# Patient Record
Sex: MALE | Race: WHITE | Employment: OTHER | URBAN - METROPOLITAN AREA
[De-identification: names, ages, dates, MRNs, and addresses within clinical notes are randomized per-mention and may not be internally consistent; named-entity substitution may affect disease eponyms.]

---

## 2019-06-10 ENCOUNTER — HOSPITAL ENCOUNTER (OUTPATIENT)
Dept: MRI IMAGING | Age: 84
Discharge: HOME | End: 2019-06-10
Attending: FAMILY MEDICINE

## 2020-12-14 ENCOUNTER — TRANSCRIBE ORDER (OUTPATIENT)
Dept: SCHEDULING | Age: 85
End: 2020-12-14

## 2020-12-14 DIAGNOSIS — C61 MALIGNANT NEOPLASM OF PROSTATE (HCC): Primary | ICD-10-CM

## 2020-12-14 DIAGNOSIS — N18.9 CHRONIC KIDNEY DISEASE, UNSPECIFIED: Primary | ICD-10-CM

## 2020-12-30 ENCOUNTER — HOSPITAL ENCOUNTER (OUTPATIENT)
Dept: NUCLEAR MEDICINE | Age: 85
Discharge: HOME | End: 2020-12-30
Attending: UROLOGY
Payer: MEDICARE

## 2020-12-30 ENCOUNTER — HOSPITAL ENCOUNTER (OUTPATIENT)
Dept: GENERAL RADIOLOGY | Age: 85
Discharge: HOME | End: 2020-12-30
Attending: UROLOGY
Payer: MEDICARE

## 2020-12-30 ENCOUNTER — TRANSCRIBE ORDER (OUTPATIENT)
Dept: REGISTRATION | Age: 85
End: 2020-12-30

## 2020-12-30 DIAGNOSIS — C61 MALIGNANT NEOPLASM OF PROSTATE (HCC): ICD-10-CM

## 2020-12-30 DIAGNOSIS — C80.1 CANCER (HCC): ICD-10-CM

## 2020-12-30 DIAGNOSIS — C80.1 CANCER (HCC): Primary | ICD-10-CM

## 2020-12-30 PROCEDURE — 71046 X-RAY EXAM CHEST 2 VIEWS: CPT

## 2020-12-30 PROCEDURE — 78306 BONE IMAGING WHOLE BODY: CPT

## 2021-01-07 ENCOUNTER — HOSPITAL ENCOUNTER (OUTPATIENT)
Dept: RADIATION THERAPY | Age: 86
Discharge: HOME | End: 2021-01-07

## 2021-01-27 ENCOUNTER — HOSPITAL ENCOUNTER (OUTPATIENT)
Dept: RADIATION THERAPY | Age: 86
Discharge: HOME | End: 2021-01-27

## 2021-03-14 ENCOUNTER — HOSPITAL ENCOUNTER (OUTPATIENT)
Dept: PREADMISSION TESTING | Age: 86
Discharge: HOME | End: 2021-03-14
Payer: MEDICARE

## 2021-03-14 ENCOUNTER — TRANSCRIBE ORDER (OUTPATIENT)
Dept: REGISTRATION | Age: 86
End: 2021-03-14

## 2021-03-14 DIAGNOSIS — Z01.812 PRE-PROCEDURE LAB EXAM: Primary | ICD-10-CM

## 2021-03-14 PROCEDURE — U0003 INFECTIOUS AGENT DETECTION BY NUCLEIC ACID (DNA OR RNA); SEVERE ACUTE RESPIRATORY SYNDROME CORONAVIRUS 2 (SARS-COV-2) (CORONAVIRUS DISEASE [COVID-19]), AMPLIFIED PROBE TECHNIQUE, MAKING USE OF HIGH THROUGHPUT TECHNOLOGIES AS DESCRIBED BY CMS-2020-01-R: HCPCS

## 2021-03-15 LAB — SARS-COV-2, COV2NT: NOT DETECTED

## 2021-03-18 ENCOUNTER — HOSPITAL ENCOUNTER (OUTPATIENT)
Age: 86
Setting detail: OUTPATIENT SURGERY
Discharge: HOME | End: 2021-03-18
Attending: INTERNAL MEDICINE | Admitting: INTERNAL MEDICINE
Payer: MEDICARE

## 2021-03-18 ENCOUNTER — APPOINTMENT (OUTPATIENT)
Dept: ULTRASOUND IMAGING | Age: 86
End: 2021-03-18
Attending: INTERNAL MEDICINE
Payer: MEDICARE

## 2021-03-18 ENCOUNTER — ANESTHESIA (OUTPATIENT)
Dept: ENDOSCOPY | Age: 86
End: 2021-03-18
Payer: MEDICARE

## 2021-03-18 ENCOUNTER — ANESTHESIA EVENT (OUTPATIENT)
Dept: ENDOSCOPY | Age: 86
End: 2021-03-18
Payer: MEDICARE

## 2021-03-18 VITALS
WEIGHT: 192 LBS | HEART RATE: 70 BPM | DIASTOLIC BLOOD PRESSURE: 87 MMHG | SYSTOLIC BLOOD PRESSURE: 146 MMHG | HEIGHT: 71 IN | RESPIRATION RATE: 16 BRPM | TEMPERATURE: 98 F | BODY MASS INDEX: 26.88 KG/M2 | OXYGEN SATURATION: 95 %

## 2021-03-18 PROCEDURE — 77030010936 HC CLP LIG BSC -C: Performed by: INTERNAL MEDICINE

## 2021-03-18 PROCEDURE — 76040000007: Performed by: INTERNAL MEDICINE

## 2021-03-18 PROCEDURE — 74011250636 HC RX REV CODE- 250/636: Performed by: INTERNAL MEDICINE

## 2021-03-18 PROCEDURE — 88305 TISSUE EXAM BY PATHOLOGIST: CPT

## 2021-03-18 PROCEDURE — 74011250636 HC RX REV CODE- 250/636: Performed by: NURSE ANESTHETIST, CERTIFIED REGISTERED

## 2021-03-18 PROCEDURE — 77030013992 HC SNR POLYP ENDOSC BSC -B: Performed by: INTERNAL MEDICINE

## 2021-03-18 PROCEDURE — 77030021593 HC FCPS BIOP ENDOSC BSC -A: Performed by: INTERNAL MEDICINE

## 2021-03-18 PROCEDURE — 74011000250 HC RX REV CODE- 250: Performed by: NURSE ANESTHETIST, CERTIFIED REGISTERED

## 2021-03-18 PROCEDURE — 2709999900 HC NON-CHARGEABLE SUPPLY: Performed by: INTERNAL MEDICINE

## 2021-03-18 PROCEDURE — A4648 IMPLANTABLE TISSUE MARKER: HCPCS | Performed by: INTERNAL MEDICINE

## 2021-03-18 PROCEDURE — 76060000032 HC ANESTHESIA 0.5 TO 1 HR: Performed by: INTERNAL MEDICINE

## 2021-03-18 RX ORDER — MIDAZOLAM HYDROCHLORIDE 1 MG/ML
.25-5 INJECTION, SOLUTION INTRAMUSCULAR; INTRAVENOUS
Status: DISCONTINUED | OUTPATIENT
Start: 2021-03-18 | End: 2021-03-18 | Stop reason: HOSPADM

## 2021-03-18 RX ORDER — GLIMEPIRIDE 1 MG/1
0.5 TABLET ORAL
Status: ON HOLD | COMMUNITY
End: 2021-12-15 | Stop reason: CLARIF

## 2021-03-18 RX ORDER — LEVOFLOXACIN 250 MG/1
250 TABLET ORAL DAILY
Qty: 2 TAB | Refills: 0 | Status: SHIPPED | OUTPATIENT
Start: 2021-03-19 | End: 2021-03-21

## 2021-03-18 RX ORDER — DEXTROMETHORPHAN/PSEUDOEPHED 2.5-7.5/.8
1.2 DROPS ORAL
Status: DISCONTINUED | OUTPATIENT
Start: 2021-03-18 | End: 2021-03-18 | Stop reason: HOSPADM

## 2021-03-18 RX ORDER — ATROPINE SULFATE 0.1 MG/ML
0.5 INJECTION INTRAVENOUS
Status: DISCONTINUED | OUTPATIENT
Start: 2021-03-18 | End: 2021-03-18 | Stop reason: HOSPADM

## 2021-03-18 RX ORDER — TAMSULOSIN HYDROCHLORIDE 0.4 MG/1
0.4 CAPSULE ORAL
COMMUNITY

## 2021-03-18 RX ORDER — PHENYLEPHRINE HCL IN 0.9% NACL 0.4MG/10ML
SYRINGE (ML) INTRAVENOUS AS NEEDED
Status: DISCONTINUED | OUTPATIENT
Start: 2021-03-18 | End: 2021-03-18 | Stop reason: HOSPADM

## 2021-03-18 RX ORDER — NALOXONE HYDROCHLORIDE 0.4 MG/ML
0.4 INJECTION, SOLUTION INTRAMUSCULAR; INTRAVENOUS; SUBCUTANEOUS
Status: DISCONTINUED | OUTPATIENT
Start: 2021-03-18 | End: 2021-03-18 | Stop reason: HOSPADM

## 2021-03-18 RX ORDER — FENTANYL CITRATE 50 UG/ML
25-200 INJECTION, SOLUTION INTRAMUSCULAR; INTRAVENOUS
Status: DISCONTINUED | OUTPATIENT
Start: 2021-03-18 | End: 2021-03-18 | Stop reason: HOSPADM

## 2021-03-18 RX ORDER — SODIUM CHLORIDE 9 MG/ML
INJECTION, SOLUTION INTRAVENOUS
Status: DISCONTINUED | OUTPATIENT
Start: 2021-03-18 | End: 2021-03-18 | Stop reason: HOSPADM

## 2021-03-18 RX ORDER — FLUMAZENIL 0.1 MG/ML
0.2 INJECTION INTRAVENOUS
Status: DISCONTINUED | OUTPATIENT
Start: 2021-03-18 | End: 2021-03-18 | Stop reason: HOSPADM

## 2021-03-18 RX ORDER — SODIUM CHLORIDE 0.9 % (FLUSH) 0.9 %
5-40 SYRINGE (ML) INJECTION EVERY 8 HOURS
Status: DISCONTINUED | OUTPATIENT
Start: 2021-03-18 | End: 2021-03-18 | Stop reason: HOSPADM

## 2021-03-18 RX ORDER — LEVOFLOXACIN 5 MG/ML
500 INJECTION, SOLUTION INTRAVENOUS ONCE
Status: COMPLETED | OUTPATIENT
Start: 2021-03-18 | End: 2021-03-18

## 2021-03-18 RX ORDER — PROPOFOL 10 MG/ML
INJECTION, EMULSION INTRAVENOUS AS NEEDED
Status: DISCONTINUED | OUTPATIENT
Start: 2021-03-18 | End: 2021-03-18 | Stop reason: HOSPADM

## 2021-03-18 RX ORDER — EPINEPHRINE 0.1 MG/ML
1 INJECTION INTRACARDIAC; INTRAVENOUS
Status: DISCONTINUED | OUTPATIENT
Start: 2021-03-18 | End: 2021-03-18 | Stop reason: HOSPADM

## 2021-03-18 RX ORDER — SIMVASTATIN 20 MG/1
20 TABLET, FILM COATED ORAL
COMMUNITY

## 2021-03-18 RX ORDER — SODIUM CHLORIDE 0.9 % (FLUSH) 0.9 %
5-40 SYRINGE (ML) INJECTION AS NEEDED
Status: DISCONTINUED | OUTPATIENT
Start: 2021-03-18 | End: 2021-03-18 | Stop reason: HOSPADM

## 2021-03-18 RX ORDER — LEVOFLOXACIN 5 MG/ML
250 INJECTION, SOLUTION INTRAVENOUS ONCE
Status: DISCONTINUED | OUTPATIENT
Start: 2021-03-18 | End: 2021-03-18

## 2021-03-18 RX ORDER — SODIUM CHLORIDE 9 MG/ML
50 INJECTION, SOLUTION INTRAVENOUS CONTINUOUS
Status: DISCONTINUED | OUTPATIENT
Start: 2021-03-18 | End: 2021-03-18 | Stop reason: HOSPADM

## 2021-03-18 RX ORDER — LIDOCAINE HYDROCHLORIDE 20 MG/ML
INJECTION, SOLUTION EPIDURAL; INFILTRATION; INTRACAUDAL; PERINEURAL AS NEEDED
Status: DISCONTINUED | OUTPATIENT
Start: 2021-03-18 | End: 2021-03-18 | Stop reason: HOSPADM

## 2021-03-18 RX ADMIN — PROPOFOL 20 MG: 10 INJECTION, EMULSION INTRAVENOUS at 11:48

## 2021-03-18 RX ADMIN — PROPOFOL 40 MG: 10 INJECTION, EMULSION INTRAVENOUS at 11:41

## 2021-03-18 RX ADMIN — PHENYLEPHRINE HYDROCHLORIDE 120 MCG: 10 INJECTION INTRAVENOUS at 11:46

## 2021-03-18 RX ADMIN — PROPOFOL 50 MG: 10 INJECTION, EMULSION INTRAVENOUS at 11:07

## 2021-03-18 RX ADMIN — PROPOFOL 10 MG: 10 INJECTION, EMULSION INTRAVENOUS at 11:27

## 2021-03-18 RX ADMIN — PROPOFOL 20 MG: 10 INJECTION, EMULSION INTRAVENOUS at 11:33

## 2021-03-18 RX ADMIN — PROPOFOL 20 MG: 10 INJECTION, EMULSION INTRAVENOUS at 11:25

## 2021-03-18 RX ADMIN — LEVOFLOXACIN 500 MG: 5 INJECTION, SOLUTION INTRAVENOUS at 11:40

## 2021-03-18 RX ADMIN — PROPOFOL 50 MG: 10 INJECTION, EMULSION INTRAVENOUS at 11:08

## 2021-03-18 RX ADMIN — PROPOFOL 20 MG: 10 INJECTION, EMULSION INTRAVENOUS at 11:12

## 2021-03-18 RX ADMIN — PROPOFOL 20 MG: 10 INJECTION, EMULSION INTRAVENOUS at 11:37

## 2021-03-18 RX ADMIN — PROPOFOL 20 MG: 10 INJECTION, EMULSION INTRAVENOUS at 11:14

## 2021-03-18 RX ADMIN — SODIUM CHLORIDE: 900 INJECTION, SOLUTION INTRAVENOUS at 11:03

## 2021-03-18 RX ADMIN — PROPOFOL 20 MG: 10 INJECTION, EMULSION INTRAVENOUS at 11:20

## 2021-03-18 RX ADMIN — PROPOFOL 30 MG: 10 INJECTION, EMULSION INTRAVENOUS at 11:35

## 2021-03-18 RX ADMIN — PHENYLEPHRINE HYDROCHLORIDE 120 MCG: 10 INJECTION INTRAVENOUS at 11:32

## 2021-03-18 RX ADMIN — PROPOFOL 20 MG: 10 INJECTION, EMULSION INTRAVENOUS at 11:18

## 2021-03-18 RX ADMIN — PROPOFOL 20 MG: 10 INJECTION, EMULSION INTRAVENOUS at 11:16

## 2021-03-18 RX ADMIN — PROPOFOL 20 MG: 10 INJECTION, EMULSION INTRAVENOUS at 11:10

## 2021-03-18 RX ADMIN — PROPOFOL 20 MG: 10 INJECTION, EMULSION INTRAVENOUS at 11:22

## 2021-03-18 RX ADMIN — LIDOCAINE HYDROCHLORIDE 60 MG: 20 INJECTION, SOLUTION EPIDURAL; INFILTRATION; INTRACAUDAL; PERINEURAL at 11:07

## 2021-03-18 RX ADMIN — PROPOFOL 20 MG: 10 INJECTION, EMULSION INTRAVENOUS at 11:31

## 2021-03-18 NOTE — ANESTHESIA PREPROCEDURE EVALUATION
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 Pacemaker has been removed. No further sequelae. Seen and cleared by cardiology prior to this procedure.    GI/Hepatic/Renal         Renal disease: CRI       Endo/Other    Diabetes: well controlled, type 2    Cancer    Comments: Prostate CA Other Findings            Physical Exam    Airway  Mallampati: II  TM Distance: 4 - 6 cm  Neck ROM: normal range of motion   Mouth opening: Normal     Cardiovascular    Rhythm: regular  Rate: normal         Dental    Dentition: Poor dentition     Pulmonary  Breath sounds clear to auscultation               Abdominal  GI exam deferred       Other Findings            Anesthetic Plan    ASA: 3  Anesthesia type: MAC and total IV anesthesia          Induction: Intravenous  Anesthetic plan and risks discussed with: Patient

## 2021-03-18 NOTE — PROCEDURES
[address]  [address] [facility], [address] [place] [address]      Colonoscopy Operative Report    Lisseth Teixeira  [id number]  1935      Procedure Type:   Colonoscopy --screening     Indications:    Screening colonoscopy         Pre-operative Diagnosis: see indication above    Post-operative Diagnosis:  See findings below    :  Tea Huffman. Alvaro Catherine MD      Referring Provider: WILFREDO Mittal MD, Avril Ballard MD      Sedation:  MAC anesthesia Propofol      Procedure Details:  After informed consent was obtained with all risks and benefits of procedure explained and preoperative exam completed, the patient was taken to the endoscopy suite and placed in the left lateral decubitus position. Upon sequential sedation as per above, a digital rectal exam was performed demonstrating internal hemorrhoids. The Olympus pediatric videocolonoscope  was inserted in the rectum and carefully advanced to the cecum, which was identified by the ileocecal valve and appendiceal orifice. The cecum was identified by the ileocecal valve and appendiceal orifice. The quality of preparation was good. The colonoscope was slowly withdrawn with careful evaluation between folds. Retroflexion in the rectum was completed . Findings:   Rectum: normal  Sigmoid: single 7 mm sessile polyp - removed with hot snare. Two Resolution 360 clips placed to close polypectomy site. Mild diverticulosis  Descending Colon: single 5-6 mm sessile polyp removed with hot snare. Two Resolution 360 clips placed to close polypectomy site  Transverse Colon: normal  Ascending Colon: normal  Cecum: two 2-3 mm sessile polyps - removed with cold biopsy forceps  Terminal Ileum: not intubated      Specimen Removed: 1. Cecal polyps, 2. Descending colon polyp, 3. Sigmoid polyp    Complications: None. EBL:  None. Impression:    1. Colon polyps - removed  2. Mild sigmoid diverticulosis  3.  Small internal Edwardo Acevedo 4:38 PM Note      Reason for Call:  Other call back and prescription     Detailed comments: Zaria is calling on behalf on Jane. She is wondering if you were the one that requested the lidocaine cream. Also if you are able to prescribe the Tramadol on a as needed basis instead of scheduled. She also has a few other questions for the doctor if you can please call and speak to her. Thank you      Phone Number Patient can be reached at: [phone]     Best Time: Any     Can we leave a detailed message on this number? YES     Call taken on 7/10/2018 at 4:38 PM by Edwardo Acevedo              Original message above placed in wife's chart in error. Spoke with Zaria and verified correct patient info.    Zaria calling with 2 concerns.     1) Verifying reason for new Rx sent for Lidocaine ointment. Zaria advised of Notes per Dr. Burch in phone encounter on 6/26/18:  Issa Burch MD 8:45 AM Note      Inform patient's wife that I will send Rx for Lidocaine 5% ointment as substitute for Lidocaine 5% patch.        2) Patient previously taking Tramadol scheduled along with prn dosing. The last script prescribed was for scheduled dosing only. Zaria needs clarification on change in dosing.    Advised Zaria will route to Dr. Burch for review.  Arely Kwan, CMA           hemorrhoids    Recommendations:  1. Follow up surgical pathology  2. High fiber diet education  3. Repeat colonoscopy for interval surveillance will not be needed given his age of 82  4. Proceed to rectal endoscopic ultrasound with placement of fiducial markers into the prostate    Signed By: Oriana Sal.  James Burt MD     3/18/2021  12:04 PM

## 2021-03-18 NOTE — PROCEDURES
[address]  [place], [address]     Rectal Endoscopic Ultrasound with Fiducial Marker Placement     NAME:  Coleen Portillo   :   1935   MRN:   [id number]       Procedure Name: Rectal endoscopic ultrasound with fiducial marker placement    Indications: prostate cancer    : Pedro Pablo Wagner. Cintia Perez MD    Referring Provider: -WILFREDO Montana MD, -Justin Fabian MD      Anethesia/Sedation:  MAC anesthesia Propofol      Procedure Details   After informed consent was obtained for the procedure, with all risks and benefits of procedure explained the patient was taken to the endoscopy suite and placed in the left lateral decubitus position. Initially, a colonoscopy was performed. Please see separate procedure note for details. The rectal linear echoendoscope was passed to 25 cm. The iliac vessels were seen and were normal, and there was no adenopathy appreciated. The patient tolerated the procedure well. Findings:     The prostate gland, seminal vesicles, and bladder were identified. A total of four 0.46 mm by 5 mm Lumicoil platinum fiducial markers were placed into the prostate. An array of anechoic spaces/septations were seen in the prostate and these were carefully avoided. Specimen Removed: none    Complications: None. EBL:  None. Impression:    1. Placement of four Lumicoil platinum fiducial markers into the prostate for the purposes of targeting radiation therapy    Recommendations:     1. Levaquin 500 mg IV administered during the procedure. This dose was cleared by pharmacy in setting of his known CKD  2. Script for Levaquin 250 mg daily for two days provided. To be started tomorrow 3/19/21  3. Follow up with Dr. Marily Suresh By: Pedro Pablo Wagner.  Cintia Perez MD     3/18/2021  12:09 PM

## 2021-03-18 NOTE — DISCHARGE INSTRUCTIONS
[facility]    COLON DISCHARGE INSTRUCTIONS    Ronny Faye  [id number]  1935    Discomfort:  Redness at IV site- apply warm compress to area; if redness or soreness persist- contact your physician  There may be a slight amount of blood passed from the rectum  Gaseous discomfort- walking, belching will help relieve any discomfort  You may not operate a vehicle for 12 hours  You may not engage in an occupation involving machinery or appliances for rest of today  You may not drink alcoholic beverages for at least 12 hours  Avoid making any critical decisions for at least 24 hour  DIET:  You may resume your regular diet - however -  remember your colon is empty and a heavy meal will produce gas. Avoid these foods:  vegetables, fried / greasy foods, carbonated drinks     ACTIVITY:  You may  resume your normal daily activities it is recommended that you spend the remainder of the day resting -  avoid any strenuous activity. CALL M.D. ANY SIGN OF:   Increasing pain, nausea, vomiting  Abdominal distension (swelling)  New increased bleeding (oral or rectal)  Fever (chills)  Pain in chest area  Bloody discharge from nose or mouth  Shortness of breath      Follow-up Instructions:   Call Dr. Keri Ziegler for any questions or problems at [address] [place] [address] St:   Your colonoscopy showed 4 polyps, which were removed. We will contact you about the pathology results. Fiducial markers were placed into the prostate. A prescription for an antibiotic has been provided. You do not need to take this antibiotic today as you received antibiotics during your procedure. Please take this two day course starting tomorrow (3/19/21). Please follow up with Dr. Bharat Donahue. Telephone # [phone]      Signed By: Tommy Ng.  Jordan Hawley MD     3/18/2021  12:02 PM       DISCHARGE SUMMARY from Nurse    The following personal items collected during your admission are returned to you:   Dental Appliance: Dental Appliances: None  Vision: Visual Aid: Glasses, At bedside  Hearing Aid:    Jewelry:    Clothing:    Other Valuables:    Valuables sent to safe:      Learning About Coronavirus (COVID-19)  Coronavirus (COVID-19): Overview  What is coronavirus ([id number])? The coronavirus disease (COVID-19) is caused by a virus. It is an illness that was first found in [place], [place], in December 2019. It has since spread worldwide. The virus can cause fever, cough, and trouble breathing. In severe cases, it can cause pneumonia and make it hard to breathe without help. It can cause death. Coronaviruses are a large group of viruses. They cause the common cold. They also cause more serious illnesses like Middle East respiratory syndrome (MERS) and severe acute respiratory syndrome (SARS). COVID-19 is caused by a novel coronavirus. That means it's a new type that has not been seen in people before. This virus spreads person-to-person through droplets from coughing and sneezing. It can also spread when you are close to someone who is infected. And it can spread when you touch something that has the virus on it, such as a doorknob or a tabletop. What can you do to protect yourself from coronavirus (COVID-19)? The best way to protect yourself from getting sick is to:  · Avoid areas where there is an outbreak. · Avoid contact with people who may be infected. · Wash your hands often with soap or alcohol-based hand sanitizers. · Avoid crowds and try to stay at least 6 feet away from other people. · Wash your hands often, especially after you cough or sneeze. Use soap and water, and scrub for at least 20 seconds. If soap and water aren't available, use an alcohol-based hand . · Avoid touching your mouth, nose, and eyes. What can you do to avoid spreading the virus to others? To help avoid spreading the virus to others:  · Cover your mouth with a tissue when you cough or sneeze. Then throw the tissue in the trash. · Use a disinfectant to clean things that you touch often. · Stay home if you are sick or have been exposed to the virus. Don't go to school, work, or public areas. And don't use public transportation. · If you are sick:  ? Leave your home only if you need to get medical care. But call the doctor's office first so they know you're coming. And wear a face mask, if you have one.  ? If you have a face mask, wear it whenever you're around other people. It can help stop the spread of the virus when you cough or sneeze. ? Clean and disinfect your home every day. Use household  and disinfectant wipes or sprays. Take special care to clean things that you grab with your hands. These include doorknobs, remote controls, phones, and handles on your refrigerator and microwave. And don't forget countertops, tabletops, bathrooms, and computer keyboards. When to call for help  Call 911 anytime you think you may need emergency care. For example, call if:  · You have severe trouble breathing. (You can't talk at all.)  · You have constant chest pain or pressure. · You are severely dizzy or lightheaded. · You are confused or can't think clearly. · Your face and lips have a blue color. · You pass out (lose consciousness) or are very hard to wake up. Call your doctor now if you develop symptoms such as:  · Shortness of breath. · Fever. · Cough. If you need to get care, call ahead to the doctor's office for instructions before you go. Make sure you wear a face mask, if you have one, to prevent exposing other people to the virus. Where can you get the latest information? The following health organizations are tracking and studying this virus. Their websites contain the most up-to-date information. Edwige Rai also learn what to do if you think you may have been exposed to the virus. · U.S. Centers for Disease Control and Prevention (CDC):  The CDC provides updated news about the disease and travel advice. The website also tells you how to prevent the spread of infection. www.cdc.gov  · World Health Organization [facility]): WHO offers information about the virus outbreaks. WHO also has travel advice. www.who.int  Current as of: April 1, 2020               Content Version: 12.4  © 1184-9387 [employer]wise, Incorporated. Care instructions adapted under license by your healthcare professional. If you have questions about a medical condition or this instruction, always ask your healthcare professional. [address] any warranty or liability for your use of this information.

## 2021-03-18 NOTE — H&P
[address], [address] [place] [address]      History and Physical       NAME:  Danae Guzman   :   1935   MRN:   [id number]             History of Present Illness:  Patient is a 82 y.o. who is seen for colon cancer screening. Last colonoscopy was >5 years ago and no polyps were removed. He has prostate cancer and we have been requested to place fiducial markers into the prostate. PMH:  Past Medical History:   Diagnosis Date    Arthritis     right hand    Cancer ([address]     skin cancer,left ear    Hypertension     Psychiatric disorder     depression       PSH:  Past Surgical History:   Procedure Laterality Date    HX ORTHOPAEDIC      right shoylder cyst removed,part of clavicle    HX PACEMAKER      35 years ago changedx1 then removed    HX TONSILLECTOMY      adenoidectomy as child       Allergies:  No Known Allergies    Home Medications:  Prior to Admission Medications   Prescriptions Last Dose Informant Patient Reported? Taking? PAROXETINE HCL PO 3/18/2021 at Unknown time  Yes Yes   Sig: Take  by mouth. aspirin delayed-release (ASPIRIN LOW DOSE) 81 mg tablet 3/13/2021  Yes No   Sig: Take 81 mg by mouth daily. calcium phosphate trib/vit D3 (CITRACAL + D3, CALCIUM PHOS, PO) 3/17/2021 at Unknown time  Yes Yes   Sig: Take  by mouth.   glimepiride (AMARYL) 1 mg tablet 3/17/2021 at Unknown time  Yes Yes   Sig: Take 0.5 mg by mouth Daily (before breakfast). leuprolide acetate (LUPRON DEPOT IM)   Yes Yes   Sig: by IntraMUSCular route. lisinopril (PRINIVIL, ZESTRIL) 10 mg tablet 3/17/2021 at Unknown time  Yes Yes   Sig: Take 10 mg by mouth daily. lorazepam (ATIVAN) 1 mg tablet 3/17/2021 at Unknown time  Yes Yes   Sig: Take 1 mg by mouth nightly. simvastatin (ZOCOR) 20 mg tablet 3/17/2021 at Unknown time  Yes Yes   Sig: Take  by mouth nightly. tamsulosin (FLOMAX) 0.4 mg capsule 3/17/2021 at Unknown time  Yes Yes   Sig: Take 0.4 mg by mouth daily. Facility-Administered Medications: None       Hospital Medications:  No current facility-administered medications for this encounter. Social History:  Social History     Tobacco Use    Smoking status: Former Smoker     Years: 40.00    Smokeless tobacco: Never Used   Substance Use Topics    Alcohol use: Yes     Comment: rarely       Family History:  History reviewed. No pertinent family history. Review of Systems:      Constitutional: negative fever, negative chills, negative weight loss  Eyes:   negative visual changes  ENT:   negative sore throat, tongue or lip swelling  Respiratory:  negative cough, negative dyspnea  Cards:  negative for chest pain, palpitations, lower extremity edema  GI:   See HPI  :  negative for frequency, dysuria  Integument:  negative for rash and pruritus  Heme:  negative for easy bruising and gum/nose bleeding  Musculoskel: negative for myalgias,  back pain and muscle weakness  Neuro: negative for headaches, dizziness, vertigo  Psych:  negative for feelings of anxiety, depression       Objective:   No data found. No intake/output data recorded. No intake/output data recorded. EXAM:     NEURO-a&o   HEENT-wnl   LUNGS-clear    COR-regular rate and rhythym     ABD-soft , no tenderness, no rebound, good bs     EXT-no edema     Data Review     No results for input(s): WBC, HGB, HCT, PLT, HGBEXT, HCTEXT, PLTEXT in the last 72 hours. No results for input(s): NA, K, CL, CO2, BUN, CREA, GLU, PHOS, CA in the last 72 hours. No results for input(s): AP, TBIL, TP, ALB, GLOB, GGT, AML, LPSE in the last 72 hours. No lab exists for component: SGOT, GPT, AMYP, HLPSE  No results for input(s): INR, PTP, APTT, INREXT in the last 72 hours. Assessment:     · Colon cancer screening  · Prostate cancer   There is no problem list on file for this patient.     Plan:   · The patient was counseled at length about the risks of amber Covid-19 in the erica-operative and post-operative states including the recovery window of their procedure. The patient was made aware that amber Covid-19 after a surgical procedure may worsen their prognosis for recovering from the virus and lend to a higher morbidity and or mortality risk. The patient was given the options of postponing their procedure. All of the risks, benefits, and alternatives were discussed. The patient does wish to proceed with the procedure. · Endoscopic procedure with MAC     Signed By: Kiana Mendoza.  Fabricio Uribe MD     3/18/2021  10:40 AM

## 2021-03-18 NOTE — ANESTHESIA POSTPROCEDURE EVALUATION
Procedure(s):  COLONOSCOPY/RECTAL EUS WITH FIDUCIAL MARKERS   :-  ENDOSCOPIC ULTRASOUND (EUS)  ENDOSCOPIC POLYPECTOMY  RESOLUTION CLIP. MAC    Anesthesia Post Evaluation        Patient location during evaluation: PACU  Note status: adequate.   Level of consciousness: awake  Pain management: satisfactory to patient  Airway patency: patent  Anesthetic complications: no  Cardiovascular status: acceptable  Respiratory status: acceptable  Hydration status: acceptable  Post anesthesia nausea and vomiting:  controlled      INITIAL Post-op Vital signs:   Vitals Value Taken Time   /87 03/18/21 1230   Temp 36.7 °C (98 °F) 03/18/21 1207   Pulse 70 03/18/21 1230   Resp 16 03/18/21 1230   SpO2 95 % 03/18/21 1230

## 2021-03-18 NOTE — ROUTINE PROCESS
Ayaan [facility]  1935  [id number]    Situation:  Verbal report received from: Victoriano Sanchez  Procedure: Procedure(s):  COLONOSCOPY/RECTAL EUS WITH FIDUCIAL MARKERS   :-  ENDOSCOPIC ULTRASOUND (EUS)  ENDOSCOPIC POLYPECTOMY  RESOLUTION CLIP    Background:    Preoperative diagnosis: SCREENING/INSERTION OF FIDUCIAL MARKERS FOR PROSTATE CANCER  Postoperative diagnosis: Colon Polyps  Diverticulosis  4 Fiducial Markers Placed     :  Dr. Kenneth Bueno  Assistant(s): Endoscopy Technician-1: Esau Oro  Endoscopy RN-1: Ervin Mendoza RN    Specimens:   ID Type Source Tests Collected by Time Destination   1 : Cecal Polyps Preservative Cecum  John Zazueta MD 3/18/2021 1112 Pathology   2 : Descending Colon Polyp Preservative Colon, Descending  John Zazueta MD 3/18/2021 1119 Pathology   3 : Sigmoid Colon Polyp Preservative Sigmoid  John Zazueta MD 3/18/2021 1124 Pathology     H. Pylori  no    Assessment:  Intra-procedure medications   Anesthesia gave intra-procedure sedation and medications, see anesthesia flow sheet yes    Intravenous fluids: NS@ KVO     Vital signs stable     Abdominal assessment: round and soft     Recommendation:  Discharge patient per MD order.   Family or Friend Dtr [facility]  Permission to share finding with family or friend yes

## 2021-03-18 NOTE — PROGRESS NOTES

## 2021-03-25 ENCOUNTER — HOSPITAL ENCOUNTER (OUTPATIENT)
Dept: RADIATION THERAPY | Age: 86
Discharge: HOME | End: 2021-03-25

## 2021-04-13 ENCOUNTER — HOSPITAL ENCOUNTER (OUTPATIENT)
Dept: RADIATION THERAPY | Age: 86
Discharge: HOME | End: 2021-04-13

## 2021-04-14 ENCOUNTER — HOSPITAL ENCOUNTER (OUTPATIENT)
Dept: RADIATION THERAPY | Age: 86
Discharge: HOME | End: 2021-04-14

## 2021-04-15 ENCOUNTER — HOSPITAL ENCOUNTER (OUTPATIENT)
Dept: RADIATION THERAPY | Age: 86
Discharge: HOME | End: 2021-04-15

## 2021-04-19 ENCOUNTER — HOSPITAL ENCOUNTER (OUTPATIENT)
Dept: RADIATION THERAPY | Age: 86
Discharge: HOME | End: 2021-04-19

## 2021-04-20 ENCOUNTER — HOSPITAL ENCOUNTER (OUTPATIENT)
Dept: RADIATION THERAPY | Age: 86
Discharge: HOME | End: 2021-04-20

## 2021-04-21 ENCOUNTER — HOSPITAL ENCOUNTER (OUTPATIENT)
Dept: RADIATION THERAPY | Age: 86
Discharge: HOME | End: 2021-04-21

## 2021-04-22 ENCOUNTER — HOSPITAL ENCOUNTER (OUTPATIENT)
Dept: RADIATION THERAPY | Age: 86
Discharge: HOME | End: 2021-04-22

## 2021-04-23 ENCOUNTER — HOSPITAL ENCOUNTER (OUTPATIENT)
Dept: RADIATION THERAPY | Age: 86
Discharge: HOME | End: 2021-04-23

## 2021-04-26 ENCOUNTER — HOSPITAL ENCOUNTER (OUTPATIENT)
Dept: RADIATION THERAPY | Age: 86
Discharge: HOME | End: 2021-04-26

## 2021-04-27 ENCOUNTER — HOSPITAL ENCOUNTER (OUTPATIENT)
Dept: RADIATION THERAPY | Age: 86
Discharge: HOME | End: 2021-04-27

## 2021-04-28 ENCOUNTER — HOSPITAL ENCOUNTER (OUTPATIENT)
Dept: RADIATION THERAPY | Age: 86
Discharge: HOME | End: 2021-04-28

## 2021-04-29 ENCOUNTER — HOSPITAL ENCOUNTER (OUTPATIENT)
Dept: RADIATION THERAPY | Age: 86
Discharge: HOME | End: 2021-04-29

## 2021-04-30 ENCOUNTER — HOSPITAL ENCOUNTER (OUTPATIENT)
Dept: RADIATION THERAPY | Age: 86
Discharge: HOME | End: 2021-04-30

## 2021-05-03 ENCOUNTER — HOSPITAL ENCOUNTER (OUTPATIENT)
Dept: RADIATION THERAPY | Age: 86
Discharge: HOME | End: 2021-05-03

## 2021-05-04 ENCOUNTER — HOSPITAL ENCOUNTER (OUTPATIENT)
Dept: RADIATION THERAPY | Age: 86
Discharge: HOME | End: 2021-05-04

## 2021-05-05 ENCOUNTER — HOSPITAL ENCOUNTER (OUTPATIENT)
Dept: RADIATION THERAPY | Age: 86
Discharge: HOME | End: 2021-05-05

## 2021-05-06 ENCOUNTER — HOSPITAL ENCOUNTER (OUTPATIENT)
Dept: RADIATION THERAPY | Age: 86
Discharge: HOME | End: 2021-05-06

## 2021-05-07 ENCOUNTER — HOSPITAL ENCOUNTER (OUTPATIENT)
Dept: RADIATION THERAPY | Age: 86
Discharge: HOME | End: 2021-05-07

## 2021-05-10 ENCOUNTER — HOSPITAL ENCOUNTER (OUTPATIENT)
Dept: RADIATION THERAPY | Age: 86
Discharge: HOME | End: 2021-05-10

## 2021-05-11 ENCOUNTER — HOSPITAL ENCOUNTER (OUTPATIENT)
Dept: RADIATION THERAPY | Age: 86
Discharge: HOME | End: 2021-05-11

## 2021-05-12 ENCOUNTER — HOSPITAL ENCOUNTER (OUTPATIENT)
Dept: RADIATION THERAPY | Age: 86
Discharge: HOME | End: 2021-05-12

## 2021-05-13 ENCOUNTER — HOSPITAL ENCOUNTER (OUTPATIENT)
Dept: RADIATION THERAPY | Age: 86
Discharge: HOME | End: 2021-05-13

## 2021-05-14 ENCOUNTER — HOSPITAL ENCOUNTER (OUTPATIENT)
Dept: RADIATION THERAPY | Age: 86
Discharge: HOME | End: 2021-05-14

## 2021-05-17 ENCOUNTER — HOSPITAL ENCOUNTER (OUTPATIENT)
Dept: RADIATION THERAPY | Age: 86
Discharge: HOME | End: 2021-05-17

## 2021-05-18 ENCOUNTER — HOSPITAL ENCOUNTER (OUTPATIENT)
Dept: RADIATION THERAPY | Age: 86
Discharge: HOME | End: 2021-05-18

## 2021-05-19 ENCOUNTER — HOSPITAL ENCOUNTER (OUTPATIENT)
Dept: RADIATION THERAPY | Age: 86
Discharge: HOME | End: 2021-05-19

## 2021-05-20 ENCOUNTER — HOSPITAL ENCOUNTER (OUTPATIENT)
Dept: RADIATION THERAPY | Age: 86
Discharge: HOME | End: 2021-05-20

## 2021-05-21 ENCOUNTER — HOSPITAL ENCOUNTER (OUTPATIENT)
Dept: RADIATION THERAPY | Age: 86
Discharge: HOME | End: 2021-05-21

## 2021-06-10 PROBLEM — T44.6X5A TAMSULOSIN-ASSOCIATED INTRAOPERATIVE FLOPPY IRIS SYNDROME (IFIS): Status: ACTIVE | Noted: 2021-06-10

## 2021-06-10 PROBLEM — H25.811 COMBINED FORMS OF AGE-RELATED CATARACT OF RIGHT EYE: Status: ACTIVE | Noted: 2021-06-10

## 2021-06-10 PROBLEM — H21.81 TAMSULOSIN-ASSOCIATED INTRAOPERATIVE FLOPPY IRIS SYNDROME (IFIS): Status: ACTIVE | Noted: 2021-06-10

## 2021-06-10 NOTE — PERIOP NOTES
[facility]  Ambulatory Surgery Unit  Pre-operative Instructions    Surgery/Procedure Date  June 16, 2021            Tentative Arrival Time TBD      1. On the day of your surgery/procedure, please report to the Ambulatory Surgery Unit Registration Desk and sign in at your designated time. The Ambulatory Surgery Unit is located in [facility] on the [facility] side of the [facility] across from the [address] [place] [address]. Please have all of your health insurance cards and a photo ID. 2. You must have someone with you to drive you home, as you should not drive a car for 24 hours following anesthesia. Please make arrangements for a responsible adult friend or family member to stay with you for at least the first 24 hours after your surgery. 3. Do not have anything to eat or drink (including water, gum, mints, coffee, juice) after 11:59 PM,, June 15th. This may not apply to medications prescribed by your physician. (Please note below the special instructions with medications to take the morning of surgery, if applicable.)    4. We recommend you do not drink any alcoholic beverages for 24 hours before and after your surgery. 5. Contact your surgeons office for instructions on the following medications: non-steroidal anti-inflammatory drugs (i.e. Advil, Aleve), vitamins, and supplements. (Some surgeons will want you to stop these medications prior to surgery and others may allow you to take them)   **If you are currently taking Plavix, Coumadin, Aspirin and/or other blood-thinning agents, contact your surgeon for instructions. ** Your surgeon will partner with the physician prescribing these medications to determine if it is safe to stop or if you need to continue taking. Please do not stop taking these medications without instructions from your surgeon.     6. In an effort to help prevent surgical site infection, we ask that you shower with an anti-bacterial soap (i.e. Dial/Safeguard, or the soap provided to you at your preadmission testing appointment) for 3 days prior to and on the morning of surgery, using a fresh towel after each shower. (Please begin this process with fresh bed linens.) Do not apply any lotions, powders, or deodorants after the shower on the day of your procedure. If applicable, please do not shave the operative site for 48 hours prior to surgery. 7. Wear comfortable clothes. Wear glasses instead of contacts. Do not bring any jewelry or money (other than copays or fees as instructed). Do not wear make-up, particularly mascara, the morning of your surgery. Do not wear nail polish, particularly if you are having foot /hand surgery. Wear your hair loose or down, no ponytails, buns, cristopher pins or clips. All body piercings must be removed. 8. You should understand that if you do not follow these instructions your surgery may be cancelled. If your physical condition changes (i.e. fever, cold or flu) please contact your surgeon as soon as possible. 9. It is important that you be on time. If a situation occurs where you may be late, or if you have any questions or problems, please call [phone].    10. Your surgery time may be subject to change. You will receive a phone call the day prior to surgery to confirm your arrival time. 11. Pediatric patients: please bring a change of clothes, diapers, bottle/sippy cup, pacifier, etc.      Special Instructions: Take all medications and inhalers, as prescribed, on the morning of surgery with a sip of water EXCEPT: no diabetic medications. Insulin Dependent Diabetic patients: Take your diabetic medications as prescribed the day before surgery. Hold all diabetic medications the day of surgery. If you are scheduled to arrive for surgery after 8:00 AM, and your AM blood sugar is >200, please call Ambulatory Surgery. I understand a pre-operative phone call will be made to verify my surgery time.   In the event that I am not available, I give permission for a message to be left on my answering service and/or with another person?     yes    Preop instructions reviewed  Pt verbalized understanding.      ___________________      ___________________      ________________  (Signature of Patient)          (Witness)                   (Date and Time)

## 2021-06-15 ENCOUNTER — ANESTHESIA EVENT (OUTPATIENT)
Dept: SURGERY | Age: 86
End: 2021-06-15
Payer: MEDICARE

## 2021-06-16 ENCOUNTER — HOSPITAL ENCOUNTER (OUTPATIENT)
Age: 86
Setting detail: OUTPATIENT SURGERY
Discharge: HOME | End: 2021-06-16
Attending: OPHTHALMOLOGY | Admitting: OPHTHALMOLOGY
Payer: MEDICARE

## 2021-06-16 ENCOUNTER — ANESTHESIA (OUTPATIENT)
Dept: SURGERY | Age: 86
End: 2021-06-16
Payer: MEDICARE

## 2021-06-16 VITALS
SYSTOLIC BLOOD PRESSURE: 138 MMHG | HEIGHT: 71 IN | WEIGHT: 188 LBS | OXYGEN SATURATION: 98 % | HEART RATE: 51 BPM | RESPIRATION RATE: 12 BRPM | TEMPERATURE: 97.4 F | DIASTOLIC BLOOD PRESSURE: 49 MMHG | BODY MASS INDEX: 26.32 KG/M2

## 2021-06-16 LAB
GLUCOSE BLD STRIP.AUTO-MCNC: 133 MG/DL (ref 65–117)
GLUCOSE BLD STRIP.AUTO-MCNC: 137 MG/DL (ref 65–117)
SERVICE CMNT-IMP: ABNORMAL
SERVICE CMNT-IMP: ABNORMAL

## 2021-06-16 PROCEDURE — V2632 POST CHMBR INTRAOCULAR LENS: HCPCS | Performed by: OPHTHALMOLOGY

## 2021-06-16 PROCEDURE — 76030000000 HC AMB SURG OR TIME 0.5 TO 1: Performed by: OPHTHALMOLOGY

## 2021-06-16 PROCEDURE — 77030021352 HC CBL LD SYS DISP COVD -B: Performed by: OPHTHALMOLOGY

## 2021-06-16 PROCEDURE — 74011250636 HC RX REV CODE- 250/636: Performed by: OPHTHALMOLOGY

## 2021-06-16 PROCEDURE — 74011000250 HC RX REV CODE- 250: Performed by: OPHTHALMOLOGY

## 2021-06-16 PROCEDURE — 82962 GLUCOSE BLOOD TEST: CPT

## 2021-06-16 PROCEDURE — 74011250636 HC RX REV CODE- 250/636: Performed by: REGISTERED NURSE

## 2021-06-16 PROCEDURE — 76060000061 HC AMB SURG ANES 0.5 TO 1 HR: Performed by: OPHTHALMOLOGY

## 2021-06-16 PROCEDURE — 74011000250 HC RX REV CODE- 250

## 2021-06-16 PROCEDURE — 76210000046 HC AMBSU PH II REC FIRST 0.5 HR: Performed by: OPHTHALMOLOGY

## 2021-06-16 PROCEDURE — 77030019981 HC RETRCTR IRIS DISP ALCN -B: Performed by: OPHTHALMOLOGY

## 2021-06-16 PROCEDURE — 2709999900 HC NON-CHARGEABLE SUPPLY: Performed by: OPHTHALMOLOGY

## 2021-06-16 PROCEDURE — 74011000250 HC RX REV CODE- 250: Performed by: REGISTERED NURSE

## 2021-06-16 PROCEDURE — 74011250637 HC RX REV CODE- 250/637: Performed by: OPHTHALMOLOGY

## 2021-06-16 DEVICE — LENS IOL POST 1-PC 6X13 20.5 -- ACRYSOF: Type: IMPLANTABLE DEVICE | Site: EYE | Status: FUNCTIONAL

## 2021-06-16 RX ORDER — TROPICAMIDE 10 MG/ML
1 SOLUTION/ DROPS OPHTHALMIC ONCE
Status: COMPLETED | OUTPATIENT
Start: 2021-06-16 | End: 2021-06-16

## 2021-06-16 RX ORDER — SODIUM CHLORIDE, SODIUM LACTATE, POTASSIUM CHLORIDE, CALCIUM CHLORIDE 600; 310; 30; 20 MG/100ML; MG/100ML; MG/100ML; MG/100ML
25 INJECTION, SOLUTION INTRAVENOUS CONTINUOUS
Status: DISCONTINUED | OUTPATIENT
Start: 2021-06-16 | End: 2021-06-16 | Stop reason: HOSPADM

## 2021-06-16 RX ORDER — SODIUM CHLORIDE 0.9 % (FLUSH) 0.9 %
5-40 SYRINGE (ML) INJECTION AS NEEDED
Status: DISCONTINUED | OUTPATIENT
Start: 2021-06-16 | End: 2021-06-16 | Stop reason: HOSPADM

## 2021-06-16 RX ORDER — LIDOCAINE HYDROCHLORIDE 40 MG/ML
3 INJECTION, SOLUTION RETROBULBAR; TOPICAL ONCE
Status: COMPLETED | OUTPATIENT
Start: 2021-06-16 | End: 2021-06-16

## 2021-06-16 RX ORDER — DIPHENHYDRAMINE HYDROCHLORIDE 50 MG/ML
12.5 INJECTION, SOLUTION INTRAMUSCULAR; INTRAVENOUS AS NEEDED
Status: DISCONTINUED | OUTPATIENT
Start: 2021-06-16 | End: 2021-06-16 | Stop reason: HOSPADM

## 2021-06-16 RX ORDER — MIDAZOLAM HYDROCHLORIDE 1 MG/ML
INJECTION, SOLUTION INTRAMUSCULAR; INTRAVENOUS AS NEEDED
Status: DISCONTINUED | OUTPATIENT
Start: 2021-06-16 | End: 2021-06-16 | Stop reason: HOSPADM

## 2021-06-16 RX ORDER — TROPICAMIDE 10 MG/ML
SOLUTION/ DROPS OPHTHALMIC
Status: COMPLETED
Start: 2021-06-16 | End: 2021-06-16

## 2021-06-16 RX ORDER — LIDOCAINE HYDROCHLORIDE 10 MG/ML
0.1 INJECTION, SOLUTION EPIDURAL; INFILTRATION; INTRACAUDAL; PERINEURAL AS NEEDED
Status: DISCONTINUED | OUTPATIENT
Start: 2021-06-16 | End: 2021-06-16 | Stop reason: HOSPADM

## 2021-06-16 RX ORDER — DICLOFENAC SODIUM 1 MG/ML
1 SOLUTION/ DROPS OPHTHALMIC ONCE
Status: COMPLETED | OUTPATIENT
Start: 2021-06-16 | End: 2021-06-16

## 2021-06-16 RX ORDER — SODIUM CHLORIDE 0.9 % (FLUSH) 0.9 %
5-40 SYRINGE (ML) INJECTION EVERY 8 HOURS
Status: DISCONTINUED | OUTPATIENT
Start: 2021-06-16 | End: 2021-06-16 | Stop reason: HOSPADM

## 2021-06-16 RX ORDER — TETRACAINE HYDROCHLORIDE 5 MG/ML
SOLUTION OPHTHALMIC AS NEEDED
Status: DISCONTINUED | OUTPATIENT
Start: 2021-06-16 | End: 2021-06-16 | Stop reason: HOSPADM

## 2021-06-16 RX ORDER — ONDANSETRON 2 MG/ML
4 INJECTION INTRAMUSCULAR; INTRAVENOUS AS NEEDED
Status: DISCONTINUED | OUTPATIENT
Start: 2021-06-16 | End: 2021-06-16 | Stop reason: HOSPADM

## 2021-06-16 RX ORDER — DICLOFENAC SODIUM 1 MG/ML
SOLUTION/ DROPS OPHTHALMIC
Status: COMPLETED
Start: 2021-06-16 | End: 2021-06-16

## 2021-06-16 RX ORDER — SODIUM CHLORIDE 9 MG/ML
25 INJECTION, SOLUTION INTRAVENOUS CONTINUOUS
Status: DISCONTINUED | OUTPATIENT
Start: 2021-06-16 | End: 2021-06-16 | Stop reason: HOSPADM

## 2021-06-16 RX ORDER — PREDNISOLONE ACETATE 10 MG/ML
1 SUSPENSION/ DROPS OPHTHALMIC 2 TIMES DAILY
Status: DISCONTINUED | OUTPATIENT
Start: 2021-06-16 | End: 2021-06-16 | Stop reason: HOSPADM

## 2021-06-16 RX ORDER — DEXMEDETOMIDINE HYDROCHLORIDE 100 UG/ML
INJECTION, SOLUTION INTRAVENOUS
Status: DISPENSED
Start: 2021-06-16 | End: 2021-06-16

## 2021-06-16 RX ORDER — ERYTHROMYCIN 5 MG/G
OINTMENT OPHTHALMIC ONCE
Status: COMPLETED | OUTPATIENT
Start: 2021-06-16 | End: 2021-06-16

## 2021-06-16 RX ORDER — PROPARACAINE HYDROCHLORIDE 5 MG/ML
1 SOLUTION/ DROPS OPHTHALMIC ONCE
Status: COMPLETED | OUTPATIENT
Start: 2021-06-16 | End: 2021-06-16

## 2021-06-16 RX ORDER — TETRACAINE HYDROCHLORIDE 5 MG/ML
1 SOLUTION OPHTHALMIC
Status: ACTIVE | OUTPATIENT
Start: 2021-06-16 | End: 2021-06-16

## 2021-06-16 RX ORDER — DEXMEDETOMIDINE HYDROCHLORIDE 100 UG/ML
INJECTION, SOLUTION INTRAVENOUS AS NEEDED
Status: DISCONTINUED | OUTPATIENT
Start: 2021-06-16 | End: 2021-06-16 | Stop reason: HOSPADM

## 2021-06-16 RX ORDER — FENTANYL CITRATE 50 UG/ML
25 INJECTION, SOLUTION INTRAMUSCULAR; INTRAVENOUS
Status: DISCONTINUED | OUTPATIENT
Start: 2021-06-16 | End: 2021-06-16 | Stop reason: HOSPADM

## 2021-06-16 RX ORDER — MELATONIN
1000
COMMUNITY

## 2021-06-16 RX ORDER — POVIDONE-IODINE 10 %
SOLUTION, NON-ORAL TOPICAL
Status: COMPLETED | OUTPATIENT
Start: 2021-06-16 | End: 2021-06-16

## 2021-06-16 RX ADMIN — DICLOFENAC SODIUM 1 DROP: 1 SOLUTION/ DROPS OPHTHALMIC at 09:16

## 2021-06-16 RX ADMIN — MIDAZOLAM HYDROCHLORIDE 0.5 MG: 1 INJECTION, SOLUTION INTRAMUSCULAR; INTRAVENOUS at 10:16

## 2021-06-16 RX ADMIN — TROPICAMIDE 1 DROP: 10 SOLUTION/ DROPS OPHTHALMIC at 09:16

## 2021-06-16 RX ADMIN — DEXMEDETOMIDINE HYDROCHLORIDE 4 MCG: 100 INJECTION, SOLUTION, CONCENTRATE INTRAVENOUS at 09:53

## 2021-06-16 RX ADMIN — DEXMEDETOMIDINE HYDROCHLORIDE 2 MCG: 100 INJECTION, SOLUTION, CONCENTRATE INTRAVENOUS at 09:58

## 2021-06-16 RX ADMIN — PROPARACAINE HYDROCHLORIDE 1 DROP: 5 SOLUTION/ DROPS OPHTHALMIC at 09:15

## 2021-06-16 RX ADMIN — Medication 3 AMPULE: at 09:12

## 2021-06-16 RX ADMIN — SODIUM CHLORIDE 25 ML/HR: 9 INJECTION, SOLUTION INTRAVENOUS at 09:12

## 2021-06-16 NOTE — DISCHARGE INSTRUCTIONS
Maureen Fernandes D.O., P.CManuel  [facility] [address].  [phone]    Post-Operative Instructions for Cataract Surgery     Remove your eye shield and bandage at 12 noon the same day as surgery and start your eye drops. THROW AWAY THE GAUZE UNDER THE SHIELD.  Place the blue eye shield back on for one week while asleep, even if napping in the recliner. Use the tape included in your bag.  DO NOT RUB YOUR EYE EVER! DO NOT WIPE YOUR EYE! ONLY WIPE ON YOUR CHEEK!                DO NOT WEAR EYE MAKEUP FOR 1 WEEK!  You can shower starting tomorrow.  You may resume your previous diet.  If you use glaucoma drops in the operative eye, continue them as directed.  Common symptoms after surgery include a scratchy feeling, slight headache, red eye, and/or blurred vision. You may use Advil or Tylenol for any discomfort.  Avoid strenuous activities and driving until you see Dr. James Trevino tomorrow. Please use care when walking, your depth perception may be altered.  Bring your bag with your drops to your follow up appointment. CONTINUE DROPS UNTIL ALL BOTTLES ARE EMPTY! Follow-up appointment tomorrow at Dr. Sofia Grayson office:______8:45am______________    Please call the office at [phone] if you experience severe pain or nausea. The following personal items collected during your admission are returned to you:   Dental Appliance: Dental Appliances: Lowers, Uppers, At home  Vision: Visual Aid: Glasses (daughter)  Hearing Aid: @FLOW  DISCHARGE SUMMARY from Nurse  (1341:LAST)@  Jewelry: Jewelry: None  Clothing: Clothing: With patient  Other Valuables: Other Valuables: Eyeglasses (daughter)  Valuables sent to safe:        PATIENT INSTRUCTIONS:    After General Anesthesia or Intravenous Sedation, for 24 hours or while taking prescription Narcotics:        Someone should be with you for the next 24 hours.         For your own safety, a responsible adult must drive you home.  · Limit your activities  · Recommended activity: Rest today, up with assistance today. Do not climb stairs or shower unattended for the next 24 hours. · Please start with a soft bland diet and advance as tolerated (no nausea) to regular diet. · If you have a sore throat you should try the following: fluids, warm salt water gargles, or throat lozenges. If it does not improve after several days please follow up with your primary physician. · Do not drive and operate hazardous machinery  · Do not make important personal or business decisions  · Do  not drink alcoholic beverages  · If you have not urinated within 8 hours after discharge, please contact your surgeon on call. Report the following to your surgeon:  · Excessive pain, swelling, redness or odor of or around the surgical area  · Temperature over 100.5  · Any nausea or vomiting. · You will receive a Post Operative Call from one of the Recovery Room Nurses on the day after your surgery to check on you. It is very important for us to know how you are recovering after your surgery. If you have an issue or need to speak with someone, please call your surgeon, do not wait for the post operative call. · You may receive an e-mail or letter in the mail from [facility] regarding your experience with us in the Ambulatory Surgery Unit. Your feedback is valuable to us and we appreciate your participation in the survey. · If the above instructions are not adequate or you are having problems after your surgery, call the physician at their office number. · We wish you a speedy recovery ? What to do at Home:      *  Please give a list of your current medications to your Primary Care Provider. *  Please update this list whenever your medications are discontinued, doses are      changed, or new medications (including over-the-counter products) are added.     *  Please carry medication information at all times in case of emergency situations. If you have not received your influenza and/or pneumococcal vaccine, please follow up with your primary care physician. The discharge information has been reviewed with the patient and family. The patient and family verbalized understanding. TO PREVENT AN INFECTION      1. [address] YOUR HANDS     To prevent infection, good handwashing is the most important thing you or your caregiver can do.  Wash your hands with soap and water or use the hand  we gave you before you touch any wounds. 2.  USE CLEAN SHEETS     Use freshly cleaned sheets on your bed after surgery.  To keep the surgery site clean, do not allow pets to sleep with you while your wound is still healing. 3. STOP SMOKING    Stop smoking, or at least cut back on smoking     Smoking slows your healing. 4.  CONTROL YOUR BLOOD SUGAR     High blood sugars slow wound healing.  If you are diabetic, control your blood sugar levels before and after your surgery.

## 2021-06-16 NOTE — PERIOP NOTES
Permission received to review discharge instructions and discuss private health information with Joe Norton, daughter.

## 2021-06-16 NOTE — ANESTHESIA PREPROCEDURE EVALUATION
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 Pacemaker has been removed. No further sequelae.      GI/Hepatic/Renal         Renal disease (Stage IV): CRI       Endo/Other    Diabetes: well controlled, type 2    Arthritis and cancer (prostate, s/p XRT)    Comments: Prostate CA Other Findings   Comments: Cataracts           Physical Exam    Airway  Mallampati: III  TM Distance: 4 - 6 cm  Neck ROM: normal range of motion   Mouth opening: Normal     Cardiovascular    Rhythm: regular  Rate: normal         Dental    Dentition: Poor dentition, Upper partial plate and Lower partial plate  Comments: Partials are out   Pulmonary  Breath sounds clear to auscultation               Abdominal  GI exam deferred       Other Findings            Anesthetic Plan    ASA: 3  Anesthesia type: MAC          Induction: Intravenous  Anesthetic plan and risks discussed with: Patient      preop glucose 137

## 2021-06-16 NOTE — OP NOTES
Name: Viktor Calles MASC-6       updated 3/1/2013  Description:  Complex Kelman phacoemulsification with use of intraoperative iris hooks. PREOPERATIVE DIAGNOSES:  1. Visually impairing combined cataract, Right eye.  2. 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 POSTOPERATIVE DIAGNOSES:   1. Visually impairing combined cataract, Right eye.  2. 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 OPERATION: Complex Kelman phacoemulsification with use of intraoperative iris hooks. Procedure(s):  RIGHT EYE FIRST REFRACTIVE CATRACT REMOVAL WITH LENS IMPLANTATION COMPLEX WITH IRIS HOOKS    ANESTHESIA: Topical with intravenous sedation    TYPE OF LENS IMPLANT USED:   Implant Name Type Inv. Item Serial No.  Lot No. LRB No. Used Action   LENS IOL POST 1-PC 6X13 20.5 - [id number] Other LENS IOL POST 1-PC 6X13 20.5 [id number] [employer] N/A Right 1 Implanted       PHACO TIME:  71 seconds at an average power of  19.9%. Estimated blood loss: None    Complications: None    Specimen removed: None    DESCRIPTION OF PROCEDURE: The patient was brought to the holding area where an IV was begun at a  keep open rate. The patient was connected to cardiovascular monitoring. The patient received 1 drop of mydriacyl 1% and proparacaine 0.5%. The patient was then brought back to the operating suite and cardiovascular monitoring was reestablished. The patient was  prepped and draped in the usual manner for ocular surgery. The patient received 1 drop of Proparacaine followed by 2 drops of 5% Betadine solution in the inferior conjunctival cul-de-sac The operating microscope was brought into position and 4% Xylocaine drops were instilled onto the eye. The lid speculum was set into position. A paracentesis was created and Sugarcane solution was instilled into the anterior chamber for adequate anesthesia and pupillary dilation.  It was noted intra-operatively that the pupil failed to dilate adequately. The Viscoelastic was instilled into the anterior chamber. The Iris hooks were placed strategically at 2,4,8 and 10 o'clock to facilitate pupillary mydriasis. The 2.4 mm keratome was utilized to create the corneal incision. The capsulorrhexis was performed in a circular fashion. The hard nucleus of the lens was hydrodissected away from the capsule using BSS solution. Viscoelastic was then instilled into the anterior chamber to protect the corneal endothelium. Phacoemulsification was then carried out followed by irrigation and aspiration of any remaining cortical material. Following this, the sterile lens was removed from the sterile container and inserted into the lens injector. It was inserted into the eye without complications and positioned appropriately on the patient's visual/astigmatic axis. The iris hooks were removed without complications. The Viscoelastic was then removed from the posterior chamber as well as the anterior chamber of the eye, and Miochol was instilled posterior to the iris plane to facilitate pupillary miosis. A single interrupted 10-0 nylon suture was placed at any  sites that required it for secure would closure. Once anterior chamber stability was ensured, the patient received several instillations of Iopidine and pred forte followed by instillation of erythromycin ophthalmic ointment. The lid speculum was removed and the patient was patched with a semi-pressure dressing and shield and was brought to the recovery room in alert and stable and satisfactory postoperative condition. Instructions were given to the patients family for their immediate postoperative care, and the patient is to follow up with me in the office tomorrow.     [address] [place] [address]  6/16/2021

## 2021-06-16 NOTE — PERIOP NOTES
Patient: Fede Pedraza MRN: [id number]  SSN: [id number]   YOB: 1935  Age: 82 y.o. Sex: male     Patient is status post Procedure(s):  RIGHT EYE FIRST REFRACTIVE CATRACT REMOVAL WITH LENS IMPLANTATION COMPLEX WITH IRIS HOOKS. Surgeon(s) and Role:     * Marily Gomez, DO - Primary    Local/Dose/Irrigation: SEE MAR                Peripheral IV 06/16/21 Left Antecubital (Active)   Site Assessment Clean, dry, & intact 06/16/21 0909   Phlebitis Assessment 0 06/16/21 0909   Infiltration Assessment 0 06/16/21 0909   Dressing Status New 06/16/21 0909   Dressing Type Transparent;Tape 06/16/21 0909   Hub Color/Line Status Blue; Infusing 06/16/21 7147                           Dressing/Packing:  Incision 06/16/21 Eye Right-Dressing/Treatment: Eye pad;Eye shield; Other (Comment) (SECURED WITH TAPE BY MD.) (06/16/21 0900)

## 2021-06-16 NOTE — ANESTHESIA POSTPROCEDURE EVALUATION
Procedure(s):  RIGHT EYE FIRST REFRACTIVE CATRACT REMOVAL WITH LENS IMPLANTATION COMPLEX WITH IRIS HOOKS. MAC    Anesthesia Post Evaluation      Multimodal analgesia: multimodal analgesia not used between 6 hours prior to anesthesia start to PACU discharge  Patient location during evaluation: PACU  Patient participation: complete - patient participated  Level of consciousness: awake and alert  Pain score: 0  Airway patency: patent  Anesthetic complications: no  Cardiovascular status: acceptable  Respiratory status: acceptable  Hydration status: acceptable  Post anesthesia nausea and vomiting:  none  Final Post Anesthesia Temperature Assessment:  Normothermia (36.0-37.5 degrees C)      INITIAL Post-op Vital signs:   Vitals Value Taken Time   /49 06/16/21 1045   Temp 36.3 °C (97.4 °F) 06/16/21 1045   Pulse 54 06/16/21 1051   Resp 12 06/16/21 1051   SpO2 98 % 06/16/21 1051   Vitals shown include unvalidated device data.

## 2021-06-16 NOTE — PERIOP NOTES
Mendezimer Destiny  1935  [id number]    Situation:  Verbal report given from: Halle Marquez CRNA  Procedure: Procedure(s):  RIGHT EYE FIRST REFRACTIVE CATRACT REMOVAL WITH LENS IMPLANTATION COMPLEX WITH IRIS HOOKS    Background:    Preoperative diagnosis: Combined forms of age-related cataract of right eye [H25.811]    Postoperative diagnosis: Combined forms of age-related cataract of right eye [H25.811]    :  Dr. Chika More    Assistant(s): Circ-1: Irving Baker RN  Scrub Tech-1: Kacy Hernandez    Specimens: * No specimens in log *    Assessment:  Intra-procedure medications         Anesthesia gave intra-procedure sedation and medications, see anesthesia flow sheet     Intravenous fluids: LR@ KVO     Vital signs stable       Recommendation:    Permission to share finding with family : yes

## 2021-06-17 NOTE — PERIOP NOTES
6/17/[id number]-Emxik w/pt during post op call who stated that he had severe headache last night and saw Dr. Queen Darby this morning where she drained the excess fluid from increased pressure. Pt. Stated that he did not call Dr. Queen Darby last night, instead called his daughter who is a nurse and she told him to take 2 advil. Discussed with daughter yesterday signs of increased pressure and importance of calling Dr. Queen Darby immediately if any symptoms occur. Pt. States is coming back next week to have second eye done. Discussed importance of calling Dr. Queen Darby immediately if this occurs again after next weeks procedure.

## 2021-06-18 NOTE — PERIOP NOTES
[facility]  Ambulatory Surgery Unit  Pre-operative Instructions    Surgery/Procedure Date  Wednesday, June 23, 2021            Tentative Arrival Time TBD      1. On the day of your surgery/procedure, please report to the Ambulatory Surgery Unit Registration Desk and sign in at your designated time. The Ambulatory Surgery Unit is located in [facility] on the [facility] side of the Hospitals in [place] across from the [facility]. Please have all of your health insurance cards and a photo ID. 2. You must have someone with you to drive you home, as you should not drive a car for 24 hours following anesthesia. Please make arrangements for a responsible adult friend or family member to stay with you for at least the first 24 hours after your surgery. 3. Do not have anything to eat or drink (including water, gum, mints, coffee, juice) after 11:59 PM, Tuesday. This may not apply to medications prescribed by your physician. (Please note below the special instructions with medications to take the morning of surgery, if applicable.)    4. We recommend you do not drink any alcoholic beverages for 24 hours before and after your surgery. 5. Contact your surgeons office for instructions on the following medications: non-steroidal anti-inflammatory drugs (i.e. Advil, Aleve), vitamins, and supplements. (Some surgeons will want you to stop these medications prior to surgery and others may allow you to take them)   **If you are currently taking Plavix, Coumadin, Aspirin and/or other blood-thinning agents, contact your surgeon for instructions. ** Your surgeon will partner with the physician prescribing these medications to determine if it is safe to stop or if you need to continue taking. Please do not stop taking these medications without instructions from your surgeon.     6. In an effort to help prevent surgical site infection, we ask that you shower with an anti-bacterial soap (i.e. Dial/Safeguard, or the soap provided to you at your preadmission testing appointment) for 3 days prior to and on the morning of surgery, using a fresh towel after each shower. (Please begin this process with fresh bed linens.) Do not apply any lotions, powders, or deodorants after the shower on the day of your procedure. If applicable, please do not shave the operative site for 48 hours prior to surgery. 7. Wear comfortable clothes. Wear glasses instead of contacts. Do not bring any jewelry or money (other than copays or fees as instructed). Do not wear make-up, particularly mascara, the morning of your surgery. Do not wear nail polish, particularly if you are having foot /hand surgery. Wear your hair loose or down, no ponytails, buns, cristopher pins or clips. All body piercings must be removed. 8. You should understand that if you do not follow these instructions your surgery may be cancelled. If your physical condition changes (i.e. fever, cold or flu) please contact your surgeon as soon as possible. 9. It is important that you be on time. If a situation occurs where you may be late, or if you have any questions or problems, please call [phone].    10. Your surgery time may be subject to change. You will receive a phone call the day prior to surgery to confirm your arrival time. 11. Pediatric patients: please bring a change of clothes, diapers, bottle/sippy cup, pacifier, etc.      Special Instructions: Take all medications and inhalers, as prescribed, on the morning of surgery with a sip of water. I understand a pre-operative phone call will be made to verify my surgery time. In the event that I am not available, I give permission for a message to be left on my answering service and/or with another person?       yes    Preop instructions reviewed  Pt verbalized understanding.      ___________________      ___________________      ________________  (Signature of Patient)          (Witness) (Date and Time)

## 2021-06-22 ENCOUNTER — ANESTHESIA EVENT (OUTPATIENT)
Dept: SURGERY | Age: 86
End: 2021-06-22
Payer: MEDICARE

## 2021-06-22 PROBLEM — H25.812 COMBINED FORMS OF AGE-RELATED CATARACT OF LEFT EYE: Status: ACTIVE | Noted: 2021-06-22

## 2021-06-23 ENCOUNTER — HOSPITAL ENCOUNTER (OUTPATIENT)
Age: 86
Setting detail: OUTPATIENT SURGERY
Discharge: HOME | End: 2021-06-23
Attending: OPHTHALMOLOGY | Admitting: OPHTHALMOLOGY
Payer: MEDICARE

## 2021-06-23 ENCOUNTER — ANESTHESIA (OUTPATIENT)
Dept: SURGERY | Age: 86
End: 2021-06-23
Payer: MEDICARE

## 2021-06-23 VITALS
RESPIRATION RATE: 16 BRPM | TEMPERATURE: 98 F | OXYGEN SATURATION: 95 % | DIASTOLIC BLOOD PRESSURE: 61 MMHG | SYSTOLIC BLOOD PRESSURE: 154 MMHG | HEIGHT: 71 IN | WEIGHT: 188 LBS | BODY MASS INDEX: 26.32 KG/M2 | HEART RATE: 51 BPM

## 2021-06-23 PROBLEM — H11.002 PTERYGIUM EYE, LEFT: Chronic | Status: ACTIVE | Noted: 2021-06-23

## 2021-06-23 LAB
GLUCOSE BLD STRIP.AUTO-MCNC: 135 MG/DL (ref 65–117)
SERVICE CMNT-IMP: ABNORMAL

## 2021-06-23 PROCEDURE — 77030040356 HC CORD BPLR FRCP COVD -A: Performed by: OPHTHALMOLOGY

## 2021-06-23 PROCEDURE — 76210000040 HC AMBSU PH I REC FIRST 0.5 HR: Performed by: OPHTHALMOLOGY

## 2021-06-23 PROCEDURE — 74011000250 HC RX REV CODE- 250: Performed by: NURSE ANESTHETIST, CERTIFIED REGISTERED

## 2021-06-23 PROCEDURE — 82962 GLUCOSE BLOOD TEST: CPT

## 2021-06-23 PROCEDURE — 77030021352 HC CBL LD SYS DISP COVD -B: Performed by: OPHTHALMOLOGY

## 2021-06-23 PROCEDURE — 74011000250 HC RX REV CODE- 250: Performed by: OPHTHALMOLOGY

## 2021-06-23 PROCEDURE — 74011250636 HC RX REV CODE- 250/636: Performed by: NURSE ANESTHETIST, CERTIFIED REGISTERED

## 2021-06-23 PROCEDURE — 74011250637 HC RX REV CODE- 250/637: Performed by: OPHTHALMOLOGY

## 2021-06-23 PROCEDURE — 76060000061 HC AMB SURG ANES 0.5 TO 1 HR: Performed by: OPHTHALMOLOGY

## 2021-06-23 PROCEDURE — 77030019981 HC RETRCTR IRIS DISP ALCN -B: Performed by: OPHTHALMOLOGY

## 2021-06-23 PROCEDURE — 74011250636 HC RX REV CODE- 250/636: Performed by: OPHTHALMOLOGY

## 2021-06-23 PROCEDURE — 2709999900 HC NON-CHARGEABLE SUPPLY: Performed by: OPHTHALMOLOGY

## 2021-06-23 PROCEDURE — 76030000000 HC AMB SURG OR TIME 0.5 TO 1: Performed by: OPHTHALMOLOGY

## 2021-06-23 PROCEDURE — 74011000250 HC RX REV CODE- 250

## 2021-06-23 PROCEDURE — V2632 POST CHMBR INTRAOCULAR LENS: HCPCS | Performed by: OPHTHALMOLOGY

## 2021-06-23 PROCEDURE — 76210000046 HC AMBSU PH II REC FIRST 0.5 HR: Performed by: OPHTHALMOLOGY

## 2021-06-23 DEVICE — LENS IOL POST 1-PC 6X13 21.5 -- ACRYSOF: Type: IMPLANTABLE DEVICE | Site: EYE | Status: FUNCTIONAL

## 2021-06-23 RX ORDER — DICLOFENAC SODIUM 1 MG/ML
SOLUTION/ DROPS OPHTHALMIC
Status: COMPLETED
Start: 2021-06-23 | End: 2021-06-23

## 2021-06-23 RX ORDER — DICLOFENAC SODIUM 10 MG/G
2 GEL TOPICAL AS NEEDED
COMMUNITY

## 2021-06-23 RX ORDER — DICLOFENAC SODIUM 1 MG/ML
1 SOLUTION/ DROPS OPHTHALMIC ONCE
Status: COMPLETED | OUTPATIENT
Start: 2021-06-23 | End: 2021-06-23

## 2021-06-23 RX ORDER — FENTANYL CITRATE 50 UG/ML
25 INJECTION, SOLUTION INTRAMUSCULAR; INTRAVENOUS
Status: DISCONTINUED | OUTPATIENT
Start: 2021-06-23 | End: 2021-06-23 | Stop reason: HOSPADM

## 2021-06-23 RX ORDER — TROPICAMIDE 10 MG/ML
SOLUTION/ DROPS OPHTHALMIC
Status: COMPLETED
Start: 2021-06-23 | End: 2021-06-23

## 2021-06-23 RX ORDER — ONDANSETRON 2 MG/ML
4 INJECTION INTRAMUSCULAR; INTRAVENOUS AS NEEDED
Status: DISCONTINUED | OUTPATIENT
Start: 2021-06-23 | End: 2021-06-23 | Stop reason: HOSPADM

## 2021-06-23 RX ORDER — DEXMEDETOMIDINE HYDROCHLORIDE 100 UG/ML
INJECTION, SOLUTION INTRAVENOUS AS NEEDED
Status: DISCONTINUED | OUTPATIENT
Start: 2021-06-23 | End: 2021-06-23 | Stop reason: HOSPADM

## 2021-06-23 RX ORDER — PROPARACAINE HYDROCHLORIDE 5 MG/ML
SOLUTION/ DROPS OPHTHALMIC
Status: COMPLETED
Start: 2021-06-23 | End: 2021-06-23

## 2021-06-23 RX ORDER — LIDOCAINE HYDROCHLORIDE 40 MG/ML
3 INJECTION, SOLUTION RETROBULBAR; TOPICAL ONCE
Status: COMPLETED | OUTPATIENT
Start: 2021-06-23 | End: 2021-06-23

## 2021-06-23 RX ORDER — MIDAZOLAM HYDROCHLORIDE 1 MG/ML
INJECTION, SOLUTION INTRAMUSCULAR; INTRAVENOUS AS NEEDED
Status: DISCONTINUED | OUTPATIENT
Start: 2021-06-23 | End: 2021-06-23 | Stop reason: HOSPADM

## 2021-06-23 RX ORDER — PROPARACAINE HYDROCHLORIDE 5 MG/ML
1 SOLUTION/ DROPS OPHTHALMIC ONCE
Status: COMPLETED | OUTPATIENT
Start: 2021-06-23 | End: 2021-06-23

## 2021-06-23 RX ORDER — SODIUM CHLORIDE 0.9 % (FLUSH) 0.9 %
5-40 SYRINGE (ML) INJECTION EVERY 8 HOURS
Status: DISCONTINUED | OUTPATIENT
Start: 2021-06-23 | End: 2021-06-23 | Stop reason: HOSPADM

## 2021-06-23 RX ORDER — TETRACAINE HYDROCHLORIDE 5 MG/ML
1 SOLUTION OPHTHALMIC
Status: ACTIVE | OUTPATIENT
Start: 2021-06-23 | End: 2021-06-23

## 2021-06-23 RX ORDER — SODIUM CHLORIDE 0.9 % (FLUSH) 0.9 %
5-40 SYRINGE (ML) INJECTION AS NEEDED
Status: DISCONTINUED | OUTPATIENT
Start: 2021-06-23 | End: 2021-06-23 | Stop reason: HOSPADM

## 2021-06-23 RX ORDER — TROPICAMIDE 10 MG/ML
1 SOLUTION/ DROPS OPHTHALMIC ONCE
Status: COMPLETED | OUTPATIENT
Start: 2021-06-23 | End: 2021-06-23

## 2021-06-23 RX ORDER — TETRACAINE HYDROCHLORIDE 5 MG/ML
SOLUTION OPHTHALMIC AS NEEDED
Status: DISCONTINUED | OUTPATIENT
Start: 2021-06-23 | End: 2021-06-23 | Stop reason: HOSPADM

## 2021-06-23 RX ORDER — SODIUM CHLORIDE 9 MG/ML
25 INJECTION, SOLUTION INTRAVENOUS CONTINUOUS
Status: DISCONTINUED | OUTPATIENT
Start: 2021-06-23 | End: 2021-06-23 | Stop reason: HOSPADM

## 2021-06-23 RX ORDER — PREDNISOLONE ACETATE 10 MG/ML
1 SUSPENSION/ DROPS OPHTHALMIC 2 TIMES DAILY
Status: DISCONTINUED | OUTPATIENT
Start: 2021-06-23 | End: 2021-06-23 | Stop reason: HOSPADM

## 2021-06-23 RX ORDER — POVIDONE-IODINE 10 %
SOLUTION, NON-ORAL TOPICAL
Status: COMPLETED | OUTPATIENT
Start: 2021-06-23 | End: 2021-06-23

## 2021-06-23 RX ORDER — SODIUM CHLORIDE, SODIUM LACTATE, POTASSIUM CHLORIDE, CALCIUM CHLORIDE 600; 310; 30; 20 MG/100ML; MG/100ML; MG/100ML; MG/100ML
25 INJECTION, SOLUTION INTRAVENOUS CONTINUOUS
Status: DISCONTINUED | OUTPATIENT
Start: 2021-06-23 | End: 2021-06-23 | Stop reason: HOSPADM

## 2021-06-23 RX ORDER — ERYTHROMYCIN 5 MG/G
OINTMENT OPHTHALMIC ONCE
Status: COMPLETED | OUTPATIENT
Start: 2021-06-23 | End: 2021-06-23

## 2021-06-23 RX ADMIN — DEXMEDETOMIDINE HYDROCHLORIDE 2 MCG: 100 INJECTION, SOLUTION, CONCENTRATE INTRAVENOUS at 10:03

## 2021-06-23 RX ADMIN — DICLOFENAC SODIUM 1 DROP: 1 SOLUTION/ DROPS OPHTHALMIC at 08:47

## 2021-06-23 RX ADMIN — TROPICAMIDE 1 DROP: 10 SOLUTION/ DROPS OPHTHALMIC at 08:48

## 2021-06-23 RX ADMIN — PROPARACAINE HYDROCHLORIDE 1 DROP: 5 SOLUTION/ DROPS OPHTHALMIC at 08:48

## 2021-06-23 RX ADMIN — SODIUM CHLORIDE 25 ML/HR: 9 INJECTION, SOLUTION INTRAVENOUS at 08:47

## 2021-06-23 RX ADMIN — DEXMEDETOMIDINE HYDROCHLORIDE 4 MCG: 100 INJECTION, SOLUTION, CONCENTRATE INTRAVENOUS at 09:57

## 2021-06-23 RX ADMIN — MIDAZOLAM HYDROCHLORIDE 0.5 MG: 1 INJECTION, SOLUTION INTRAMUSCULAR; INTRAVENOUS at 09:59

## 2021-06-23 NOTE — PERIOP NOTES
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 Vaishali Vance    Assistant(s): Circ-1: Britney Garza RN  Circ-2: Laura Griffin RN  Scrub Tech-1: Ainsley Older    Specimens: * No specimens in log *    Assessment:  Intra-procedure medications         Anesthesia gave intra-procedure sedation and medications, see anesthesia flow sheet     Intravenous fluids: Roddy Deyvi     Vital signs stable       Recommendation:    Permission to share finding with

## 2021-06-23 NOTE — OP NOTES
Name: Essie Gutierrez MASC-6       updated 3/1/2013  Description:  Complex Kelman phacoemulsification with use of intraoperative iris hooks. PREOPERATIVE DIAGNOSES:  1. Visually impairing combined cataract, Left eye.  2. Floppy iris syndrome    POSTOPERATIVE DIAGNOSES:   1. Visually impairing combined cataract, Left eye.  2. Floppy iris syndrome     OPERATION: Complex Kelman phacoemulsification with use of intraoperative iris hooks. Procedure(s):  LEFT EYE SECOND REFRACTIVE CATRACT REMOVAL COMPLEX WITH LENS IMPLANTATION AND IRIS HOOKS    ANESTHESIA: Topical with intravenous sedation    TYPE OF LENS IMPLANT USED:   Implant Name Type Inv. Item Serial No.  Lot No. LRB No. Used Action   LENS IOL POST 1-PC 6X13 21.5 -- ACRYSOF - [id number]  LENS IOL POST 1-PC 6X13 21.5 -- [employer] [id number] [employer] N/A Left 1 Implanted       PHACO TIME:  65 seconds at an average power of  15.2%. Estimated blood loss: None    Complications: None    Specimen removed: None    DESCRIPTION OF PROCEDURE: The patient was brought to the holding area where an IV was begun at a  keep open rate. The patient was connected to cardiovascular monitoring. The patient received 1 drop of mydriacyl 1% and proparacaine 0.5%. The patient was then brought back to the operating suite and cardiovascular monitoring was reestablished. The patient was  prepped and draped in the usual manner for ocular surgery. The patient received 1 drop of Proparacaine followed by 2 drops of 5% Betadine solution in the inferior conjunctival cul-de-sac The operating microscope was brought into position and 4% Xylocaine drops were instilled onto the eye. The lid speculum was set into position. A paracentesis was created and Sugarcane solution was instilled into the anterior chamber for adequate anesthesia and pupillary dilation. It was noted intra-operatively that the pupil failed to dilate adequately.  The Viscoelastic was instilled into the anterior chamber. The Iris hooks were placed strategically at 2,4,8 and 10 o'clock to facilitate pupillary mydriasis. The 2.4 mm keratome was utilized to create the corneal incision. The capsulorrhexis was performed in a circular fashion. The hard nucleus of the lens was hydrodissected away from the capsule using BSS solution. Viscoelastic was then instilled into the anterior chamber to protect the corneal endothelium. Phacoemulsification was then carried out followed by irrigation and aspiration of any remaining cortical material. Following this, the sterile lens was removed from the sterile container and inserted into the lens injector. It was inserted into the eye without complications and positioned appropriately on the patient's visual/astigmatic axis. The iris hooks were removed without complications. The Viscoelastic was then removed from the posterior chamber as well as the anterior chamber of the eye, and Miochol was instilled posterior to the iris plane to facilitate pupillary miosis. A single interrupted 10-0 nylon suture was placed at any  sites that required it for secure would closure. Once anterior chamber stability was ensured, the patient received several instillations of Iopidine and pred forte followed by instillation of erythromycin ophthalmic ointment. The lid speculum was removed and the patient was patched with a semi-pressure dressing and shield and was brought to the recovery room in alert and stable and satisfactory postoperative condition. Instructions were given to the patients family for their immediate postoperative care, and the patient is to follow up with me in the office tomorrow.     [address] [place] [address]  6/23/2021

## 2021-06-23 NOTE — ANESTHESIA POSTPROCEDURE EVALUATION
Procedure(s):  LEFT EYE SECOND REFRACTIVE CATRACT REMOVAL COMPLEX WITH LENS IMPLANTATION AND IRIS HOOKS, PTERGUIM. MAC    Anesthesia Post Evaluation        Patient location during evaluation: PACU  Note status: Adequate. Level of consciousness: responsive to verbal stimuli and sleepy but conscious  Pain management: satisfactory to patient  Airway patency: patent  Anesthetic complications: no  Cardiovascular status: acceptable  Respiratory status: acceptable  Hydration status: acceptable  Comments: +Post-Anesthesia Evaluation and Assessment    Patient: Alem Mascorro MRN: [id number]  SSN: [id number]   YOB: 1935  Age: 82 y.o. Sex: male      Cardiovascular Function/Vital Signs    BP (!) 154/61 (BP 1 Location: Right upper arm, BP Patient Position: At rest)   Pulse (!) 51   Temp 36.7 °C (98 °F)   Resp 16   Ht 5' 11\" (1.803 m)   Wt 85.3 kg (188 lb)   SpO2 95%   BMI 26.22 kg/m²     Patient is status post Procedure(s):  LEFT EYE SECOND REFRACTIVE CATRACT REMOVAL COMPLEX WITH LENS IMPLANTATION AND IRIS HOOKS, PTERGUIM. Nausea/Vomiting: Controlled. Postoperative hydration reviewed and adequate. Pain:  Pain Scale 1: Numeric (0 - 10) (06/23/21 1100)  Pain Intensity 1: 0 (06/23/21 1100)   Managed. Neurological Status:   Neuro (WDL): Within Defined Limits (06/23/21 1100)   At baseline. Mental Status and Level of Consciousness: Arousable. Pulmonary Status:   O2 Device: None (Room air) (06/23/21 1100)   Adequate oxygenation and airway patent. Complications related to anesthesia: None    Post-anesthesia assessment completed. No concerns. Signed By: Pat Ordonez MD    6/23/2021  Post anesthesia nausea and vomiting:  controlled      INITIAL Post-op Vital signs:   Vitals Value Taken Time   /90 06/23/21 1055   Temp 36.6 °C (97.8 °F) 06/23/21 1039   Pulse 50 06/23/21 1058   Resp 13 06/23/21 1058   SpO2 94 % 06/23/21 1058   Vitals shown include unvalidated device data.

## 2021-06-23 NOTE — DISCHARGE INSTRUCTIONS
Shweta Pandya D.O., PManuelCManuel  [facility].  [phone]    Post-Operative Instructions for Cataract Surgery     Remove your eye shield and bandage at 12 noon the same day as surgery and start your eye drops. THROW AWAY THE GAUZE UNDER THE SHIELD.  Place the blue eye shield back on for one week while asleep, even if napping in the recliner. Use the tape included in your bag.  DO NOT RUB YOUR EYE EVER! DO NOT WIPE YOUR EYE! ONLY WIPE ON YOUR CHEEK!                DO NOT WEAR EYE MAKEUP FOR 1 WEEK!  You can shower starting tomorrow.  You may resume your previous diet.  If you use glaucoma drops in the operative eye, continue them as directed.  Common symptoms after surgery include a scratchy feeling, slight headache, red eye, and/or blurred vision. You may use Advil or Tylenol for any discomfort.  Avoid strenuous activities and driving until you see Dr. Ander Sal tomorrow. Please use care when walking, your depth perception may be altered.  Bring your bag with your drops to your follow up appointment. CONTINUE DROPS UNTIL ALL BOTTLES ARE EMPTY! Follow-up appointment tomorrow at Dr. Judge Ordoñez office:________8:45am____________    Please call the office at [phone] if you experience severe pain or nausea. The following personal items collected during your admission are returned to you:   Dental Appliance: Dental Appliances: Lowers, Uppers, At home  Vision: Visual Aid: None, At home  Hearing Aid: @FLOW  DISCHARGE SUMMARY from Nurse  ([phone]:LAST)@  Jewelry: Jewelry: None  Clothing: Clothing: With patient  Other Valuables: Other Valuables: Willy Fritz, With patient  Valuables sent to safe:        PATIENT INSTRUCTIONS:    After General Anesthesia or Intravenous Sedation, for 24 hours or while taking prescription Narcotics:        Someone should be with you for the next 24 hours.         For your own safety, a responsible adult must drive you home.  · Limit your activities  · Recommended activity: Rest today, up with assistance today. Do not climb stairs or shower unattended for the next 24 hours. · Please start with a soft bland diet and advance as tolerated (no nausea) to regular diet. · If you have a sore throat you should try the following: fluids, warm salt water gargles, or throat lozenges. If it does not improve after several days please follow up with your primary physician. · Do not drive and operate hazardous machinery  · Do not make important personal or business decisions  · Do  not drink alcoholic beverages  · If you have not urinated within 8 hours after discharge, please contact your surgeon on call. Report the following to your surgeon:  · Excessive pain, swelling, redness or odor of or around the surgical area  · Temperature over 100.5  · Any nausea or vomiting. · You will receive a Post Operative Call from one of the Recovery Room Nurses on the day after your surgery to check on you. It is very important for us to know how you are recovering after your surgery. If you have an issue or need to speak with someone, please call your surgeon, do not wait for the post operative call. · You may receive an e-mail or letter in the mail from [employer] regarding your experience with us in the Ambulatory Surgery Unit. Your feedback is valuable to us and we appreciate your participation in the survey. · If the above instructions are not adequate or you are having problems after your surgery, call the physician at their office number. · We wish you a speedy recovery ? What to do at Home:      *  Please give a list of your current medications to your Primary Care Provider. *  Please update this list whenever your medications are discontinued, doses are      changed, or new medications (including over-the-counter products) are added.     *  Please carry medication information at all times in case of emergency situations. If you have not received your influenza and/or pneumococcal vaccine, please follow up with your primary care physician. The discharge information has been reviewed with the patient and family. The patient and family verbalized understanding. TO PREVENT AN INFECTION      1. [address] YOUR HANDS     To prevent infection, good handwashing is the most important thing you or your caregiver can do.  Wash your hands with soap and water or use the hand  we gave you before you touch any wounds. 2.  USE CLEAN SHEETS     Use freshly cleaned sheets on your bed after surgery.  To keep the surgery site clean, do not allow pets to sleep with you while your wound is still healing. 3. STOP SMOKING    Stop smoking, or at least cut back on smoking     Smoking slows your healing. 4.  CONTROL YOUR BLOOD SUGAR     High blood sugars slow wound healing.  If you are diabetic, control your blood sugar levels before and after your surgery.

## 2021-06-23 NOTE — OP NOTES
Name:  Kristine Fulton MASC-7      updated  5/2/11   Description:  Pterygiumectomy      PREOPERATIVE DIAGNOSIS: Visually impairing progressive pterygium, Left eye. POSTOPERATIVE DIAGNOSIS: Visually impairing progressive pterygium, [place]. OPERATION: Pterygiumectomy, [place]. ANESTHESIA: Topical with intravenous sedation. Estimated blood loss:0  Complications:0    DESCRIPTION OF OPERATION: The patient was brought to the operating suite and prepped and draped in the usual sterile manner for ocular surgery. The patient was connected to cardiovascular monitoring. Intravenous access, which had been obtained in the holding area, was utilized for IV sedation. The lid speculum was set into position and topical 4% lidocaine was used to anesthetize the surface of the eye. A muscle hook was used to dissect and delineate the area of insertion of the pterygium on the corneal surface. The Radha scissors were used to excise the pterygium. Bare sclera technique was utilized, and the conjunctiva was excised back to the insertion of the medial rectus muscle. Cautery was utilized for adequate hemostasis. Once hemostasis was achieved, the Levine, Susan. \[facility].\"" bur drill was utilized to remove the remaining pterygium from the corneal surface, and it was polished to [employer].      [address] [place] [address],   6/23/2021

## 2021-06-23 NOTE — ANESTHESIA PREPROCEDURE EVALUATION
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 Pacemaker has been removed. No further sequelae.      GI/Hepatic/Renal         Renal disease (Stage IV): CRI       Endo/Other    Diabetes: well controlled, type 2    Arthritis and cancer (prostate, s/p XRT)    Comments: Prostate CA Other Findings   Comments: Cataracts           Physical Exam    Airway  Mallampati: III  TM Distance: 4 - 6 cm  Neck ROM: normal range of motion   Mouth opening: Normal     Cardiovascular    Rhythm: regular  Rate: normal         Dental    Dentition: Poor dentition, Upper partial plate and Lower partial plate  Comments: Partials are out   Pulmonary  Breath sounds clear to auscultation               Abdominal  GI exam deferred       Other Findings            Anesthetic Plan    ASA: 3  Anesthesia type: MAC          Induction: Intravenous  Anesthetic plan and risks discussed with: Patient

## 2021-10-20 ENCOUNTER — HOSPITAL ENCOUNTER (OUTPATIENT)
Dept: RADIATION THERAPY | Age: 86
Discharge: HOME | End: 2021-10-20

## 2021-12-10 ENCOUNTER — HOSPITAL ENCOUNTER (OUTPATIENT)
Dept: PREADMISSION TESTING | Age: 86
Discharge: HOME | End: 2021-12-10
Payer: MEDICARE

## 2021-12-10 PROCEDURE — U0005 INFEC AGEN DETEC AMPLI PROBE: HCPCS

## 2021-12-10 NOTE — PERIOP NOTES
[facility]  Preoperative Instructions     Surgery Date 12/15/2021          Time of Arrival : to be called  Contact # [phone]  Covid Testing 12/10/2021     1. On the day of your surgery, please report to the Surgical Services Registration Desk and sign in at your designated time. The Surgery Center is located to the right of the Emergency Room.      2. You must have someone with you to drive you home. You should not drive a car for 24 hours following surgery. Please make arrangements for a friend or family member to stay with you for the first 24 hours after your surgery.     3. Do not have anything to eat or drink (including water, gum, mints, coffee, juice) after midnight 12/14/2021. ? This may not apply to medications prescribed by your physician. ?(Please note below the special instructions with medications to take the morning of your procedure.)     4. We recommend you do not drink any alcoholic beverages for 24 hours before and after your surgery.     5. Contact your surgeons office for instructions on the following medications: non-steroidal anti-inflammatory drugs (i.e. Advil, Aleve), vitamins, and supplements. (Some surgeons will want you to stop these medications prior to surgery and others may allow you to take them)  **If you are currently taking Plavix, Coumadin, Aspirin and/or other blood-thinning agents, contact your surgeon for instructions. ** Your surgeon will partner with the physician prescribing these medications to determine if it is safe to stop or if you need to continue taking.  Please do not stop taking these medications without instructions from your surgeon     6. Wear comfortable clothes. Wear glasses instead of contacts. Do not bring any money or jewelry. Please bring picture ID, insurance card, and any prearranged co-payment or hospital payment. Do not wear make-up, particularly mascara the morning of your surgery.   Do not wear nail polish, particularly if you are having foot /hand surgery. Wear your hair loose or down, no ponytails, buns, cristopher pins or clips. All body piercings must be removed. Please shower with antibacterial soap for three consecutive days before and on the morning of surgery, but do not apply any lotions, powders or deodorants after the shower on the day of surgery. Please use a fresh towels after each shower. Please sleep in clean clothes and change bed linens the night before surgery. Please do not shave for 48 hours prior to surgery. Shaving of the face is acceptable.     7. You should understand that if you do not follow these instructions your surgery may be cancelled. If your physical condition changes (I.e. fever, cold or flu) please contact your surgeon as soon as possible.     8. It is important that you be on time. If a situation occurs where you may be late, please call [phone] ([address] [place] [address]).     9. If you have any questions and or problems, please call [phone] (Pre-admission Testing).     10. Your surgery time may be subject to change. You will receive a phone call the evening prior if your time changes.     11. If having outpatient surgery, you must have someone to drive you here, stay with you during the duration of your stay, and to drive you home at time of discharge.     Special Instructions:        TAKE ALL MEDICATIONS DAY OF SURGERY EXCEPT:   glimepiride        I understand a pre-operative phone call will be made to verify my surgery time. In the event that I am not available, I give permission for a message to be left on my answering service and/or with another person?   yes            ___________________      __________                       _________________    (Signature of Patient)                    (Witness)                     (Date and Time)

## 2021-12-12 LAB
SARS-COV-2, XPLCVT: NOT DETECTED
SOURCE, COVRS: NORMAL

## 2021-12-15 ENCOUNTER — ANESTHESIA (OUTPATIENT)
Dept: SURGERY | Age: 86
End: 2021-12-15
Payer: MEDICARE

## 2021-12-15 ENCOUNTER — ANESTHESIA EVENT (OUTPATIENT)
Dept: SURGERY | Age: 86
End: 2021-12-15
Payer: MEDICARE

## 2021-12-15 ENCOUNTER — HOSPITAL ENCOUNTER (OUTPATIENT)
Age: 86
Setting detail: OUTPATIENT SURGERY
Discharge: HOME | End: 2021-12-15
Attending: PLASTIC SURGERY | Admitting: PLASTIC SURGERY
Payer: MEDICARE

## 2021-12-15 VITALS
TEMPERATURE: 98 F | RESPIRATION RATE: 14 BRPM | DIASTOLIC BLOOD PRESSURE: 66 MMHG | SYSTOLIC BLOOD PRESSURE: 119 MMHG | OXYGEN SATURATION: 98 % | WEIGHT: 188.49 LBS | HEIGHT: 71 IN | BODY MASS INDEX: 26.39 KG/M2 | HEART RATE: 76 BPM

## 2021-12-15 DIAGNOSIS — D03.39 MELANOMA IN SITU OF NOSE (HCC): Primary | ICD-10-CM

## 2021-12-15 LAB
GLUCOSE BLD STRIP.AUTO-MCNC: 136 MG/DL (ref 65–117)
GLUCOSE BLD STRIP.AUTO-MCNC: 139 MG/DL (ref 65–117)
SERVICE CMNT-IMP: ABNORMAL
SERVICE CMNT-IMP: ABNORMAL

## 2021-12-15 PROCEDURE — 74011250636 HC RX REV CODE- 250/636: Performed by: NURSE ANESTHETIST, CERTIFIED REGISTERED

## 2021-12-15 PROCEDURE — 74011000250 HC RX REV CODE- 250: Performed by: NURSE ANESTHETIST, CERTIFIED REGISTERED

## 2021-12-15 PROCEDURE — 77030010507 HC ADH SKN DERMBND J&J -B: Performed by: PLASTIC SURGERY

## 2021-12-15 PROCEDURE — 76210000020 HC REC RM PH II FIRST 0.5 HR: Performed by: PLASTIC SURGERY

## 2021-12-15 PROCEDURE — 76060000034 HC ANESTHESIA 1.5 TO 2 HR: Performed by: PLASTIC SURGERY

## 2021-12-15 PROCEDURE — 77030002974 HC SUT PLN J&J -A: Performed by: PLASTIC SURGERY

## 2021-12-15 PROCEDURE — 74011000250 HC RX REV CODE- 250: Performed by: PLASTIC SURGERY

## 2021-12-15 PROCEDURE — 2709999900 HC NON-CHARGEABLE SUPPLY: Performed by: PLASTIC SURGERY

## 2021-12-15 PROCEDURE — 74011250636 HC RX REV CODE- 250/636: Performed by: PLASTIC SURGERY

## 2021-12-15 PROCEDURE — 77030002933 HC SUT MCRYL J&J -A: Performed by: PLASTIC SURGERY

## 2021-12-15 PROCEDURE — 88305 TISSUE EXAM BY PATHOLOGIST: CPT

## 2021-12-15 PROCEDURE — 74011250637 HC RX REV CODE- 250/637: Performed by: PLASTIC SURGERY

## 2021-12-15 PROCEDURE — 76210000063 HC OR PH I REC FIRST 0.5 HR: Performed by: PLASTIC SURGERY

## 2021-12-15 PROCEDURE — 77030013629 HC ELECTRD NDL STRY -B: Performed by: PLASTIC SURGERY

## 2021-12-15 PROCEDURE — 74011000258 HC RX REV CODE- 258: Performed by: NURSE ANESTHETIST, CERTIFIED REGISTERED

## 2021-12-15 PROCEDURE — 77030031139 HC SUT VCRL2 J&J -A: Performed by: PLASTIC SURGERY

## 2021-12-15 PROCEDURE — 76010000153 HC OR TIME 1.5 TO 2 HR: Performed by: PLASTIC SURGERY

## 2021-12-15 PROCEDURE — 82962 GLUCOSE BLOOD TEST: CPT

## 2021-12-15 PROCEDURE — 74011250636 HC RX REV CODE- 250/636: Performed by: STUDENT IN AN ORGANIZED HEALTH CARE EDUCATION/TRAINING PROGRAM

## 2021-12-15 PROCEDURE — 77030040361 HC SLV COMPR DVT MDII -B: Performed by: PLASTIC SURGERY

## 2021-12-15 RX ORDER — FENTANYL CITRATE 50 UG/ML
INJECTION, SOLUTION INTRAMUSCULAR; INTRAVENOUS AS NEEDED
Status: DISCONTINUED | OUTPATIENT
Start: 2021-12-15 | End: 2021-12-15 | Stop reason: HOSPADM

## 2021-12-15 RX ORDER — ONDANSETRON 2 MG/ML
INJECTION INTRAMUSCULAR; INTRAVENOUS AS NEEDED
Status: DISCONTINUED | OUTPATIENT
Start: 2021-12-15 | End: 2021-12-15 | Stop reason: HOSPADM

## 2021-12-15 RX ORDER — FENTANYL CITRATE 50 UG/ML
25 INJECTION, SOLUTION INTRAMUSCULAR; INTRAVENOUS
Status: DISCONTINUED | OUTPATIENT
Start: 2021-12-15 | End: 2021-12-15 | Stop reason: HOSPADM

## 2021-12-15 RX ORDER — SODIUM CHLORIDE, SODIUM LACTATE, POTASSIUM CHLORIDE, CALCIUM CHLORIDE 600; 310; 30; 20 MG/100ML; MG/100ML; MG/100ML; MG/100ML
25 INJECTION, SOLUTION INTRAVENOUS CONTINUOUS
Status: DISCONTINUED | OUTPATIENT
Start: 2021-12-15 | End: 2021-12-15 | Stop reason: HOSPADM

## 2021-12-15 RX ORDER — OXYCODONE HYDROCHLORIDE 5 MG/1
5 TABLET ORAL AS NEEDED
Status: DISCONTINUED | OUTPATIENT
Start: 2021-12-15 | End: 2021-12-15 | Stop reason: HOSPADM

## 2021-12-15 RX ORDER — LIDOCAINE HYDROCHLORIDE 10 MG/ML
0.1 INJECTION, SOLUTION EPIDURAL; INFILTRATION; INTRACAUDAL; PERINEURAL AS NEEDED
Status: DISCONTINUED | OUTPATIENT
Start: 2021-12-15 | End: 2021-12-15 | Stop reason: HOSPADM

## 2021-12-15 RX ORDER — LIDOCAINE HYDROCHLORIDE AND EPINEPHRINE 10; 10 MG/ML; UG/ML
INJECTION, SOLUTION INFILTRATION; PERINEURAL AS NEEDED
Status: DISCONTINUED | OUTPATIENT
Start: 2021-12-15 | End: 2021-12-15 | Stop reason: HOSPADM

## 2021-12-15 RX ORDER — ONDANSETRON 2 MG/ML
4 INJECTION INTRAMUSCULAR; INTRAVENOUS AS NEEDED
Status: DISCONTINUED | OUTPATIENT
Start: 2021-12-15 | End: 2021-12-15 | Stop reason: HOSPADM

## 2021-12-15 RX ORDER — BACITRACIN ZINC 500 UNIT/G
OINTMENT (GRAM) TOPICAL AS NEEDED
Status: DISCONTINUED | OUTPATIENT
Start: 2021-12-15 | End: 2021-12-15 | Stop reason: HOSPADM

## 2021-12-15 RX ORDER — PHENYLEPHRINE HCL IN 0.9% NACL 0.4MG/10ML
SYRINGE (ML) INTRAVENOUS AS NEEDED
Status: DISCONTINUED | OUTPATIENT
Start: 2021-12-15 | End: 2021-12-15 | Stop reason: HOSPADM

## 2021-12-15 RX ORDER — PROPOFOL 10 MG/ML
INJECTION, EMULSION INTRAVENOUS AS NEEDED
Status: DISCONTINUED | OUTPATIENT
Start: 2021-12-15 | End: 2021-12-15 | Stop reason: HOSPADM

## 2021-12-15 RX ORDER — SODIUM CHLORIDE 0.9 % (FLUSH) 0.9 %
5-40 SYRINGE (ML) INJECTION EVERY 8 HOURS
Status: DISCONTINUED | OUTPATIENT
Start: 2021-12-15 | End: 2021-12-15 | Stop reason: HOSPADM

## 2021-12-15 RX ORDER — HYDROCODONE BITARTRATE AND ACETAMINOPHEN 5; 325 MG/1; MG/1
1 TABLET ORAL
Qty: 5 TABLET | Refills: 0 | Status: SHIPPED | OUTPATIENT
Start: 2021-12-15 | End: 2021-12-18

## 2021-12-15 RX ORDER — PROPOFOL 10 MG/ML
INJECTION, EMULSION INTRAVENOUS
Status: DISCONTINUED | OUTPATIENT
Start: 2021-12-15 | End: 2021-12-15 | Stop reason: HOSPADM

## 2021-12-15 RX ORDER — EPHEDRINE SULFATE/0.9% NACL/PF 50 MG/5 ML
SYRINGE (ML) INTRAVENOUS AS NEEDED
Status: DISCONTINUED | OUTPATIENT
Start: 2021-12-15 | End: 2021-12-15 | Stop reason: HOSPADM

## 2021-12-15 RX ADMIN — FENTANYL CITRATE 25 MCG: 50 INJECTION, SOLUTION INTRAMUSCULAR; INTRAVENOUS at 14:56

## 2021-12-15 RX ADMIN — PROPOFOL 25 MG: 10 INJECTION, EMULSION INTRAVENOUS at 14:56

## 2021-12-15 RX ADMIN — DEXMEDETOMIDINE HYDROCHLORIDE 4 MCG: 100 INJECTION, SOLUTION, CONCENTRATE INTRAVENOUS at 15:10

## 2021-12-15 RX ADMIN — WATER 2 G: 1 INJECTION INTRAMUSCULAR; INTRAVENOUS; SUBCUTANEOUS at 14:58

## 2021-12-15 RX ADMIN — Medication 10 MG: at 15:25

## 2021-12-15 RX ADMIN — SODIUM CHLORIDE, POTASSIUM CHLORIDE, SODIUM LACTATE AND CALCIUM CHLORIDE 25 ML/HR: 600; 310; 30; 20 INJECTION, SOLUTION INTRAVENOUS at 13:37

## 2021-12-15 RX ADMIN — DEXMEDETOMIDINE HYDROCHLORIDE 2 MCG: 100 INJECTION, SOLUTION, CONCENTRATE INTRAVENOUS at 14:54

## 2021-12-15 RX ADMIN — Medication 10 MG: at 15:18

## 2021-12-15 RX ADMIN — Medication 3 AMPULE: at 13:37

## 2021-12-15 RX ADMIN — FENTANYL CITRATE 25 MCG: 50 INJECTION, SOLUTION INTRAMUSCULAR; INTRAVENOUS at 15:04

## 2021-12-15 RX ADMIN — DEXMEDETOMIDINE HYDROCHLORIDE 2 MCG: 100 INJECTION, SOLUTION, CONCENTRATE INTRAVENOUS at 15:04

## 2021-12-15 RX ADMIN — ONDANSETRON HYDROCHLORIDE 4 MG: 2 INJECTION, SOLUTION INTRAMUSCULAR; INTRAVENOUS at 15:11

## 2021-12-15 RX ADMIN — Medication 80 MCG: at 15:45

## 2021-12-15 RX ADMIN — DEXMEDETOMIDINE HYDROCHLORIDE 2 MCG: 100 INJECTION, SOLUTION, CONCENTRATE INTRAVENOUS at 14:58

## 2021-12-15 RX ADMIN — PROPOFOL 50 MCG/KG/MIN: 10 INJECTION, EMULSION INTRAVENOUS at 14:56

## 2021-12-15 NOTE — DISCHARGE INSTRUCTIONS
May remove all dressings tomorrow and shower. Apply thick layer of ointment to graft 3-4 times per day. DISCHARGE SUMMARY from Nurse    The following personal items are in your possession at time of discharge:    Dental Appliances: None  Visual Aid: None  Home Medications: None  Jewelry: None  Clothing: None (left in in pt. room)  Other Valuables: None             PATIENT INSTRUCTIONS:    After general anesthesia or intravenous sedation, for 24 hours or while taking prescription Narcotics:  · Limit your activities  · Do not drive and operate hazardous machinery  · Do not make important personal or business decisions  · Do  not drink alcoholic beverages  · If you have not urinated within 8 hours after discharge, please contact your surgeon on call. Report the following to your surgeon:  · Excessive pain, swelling, redness or odor of or around the surgical area  · Temperature over 100.5  · Nausea and vomiting lasting longer than 4 hours or if unable to take medications  · Any signs of decreased circulation or nerve impairment to extremity: change in color, persistent  numbness, tingling, coldness or increase pain  · Any questions    To prevent infection remember to refer to your handout on handwashing given to you by your nurse. *  Please give a list of your current medications to your Primary Care Provider. *  Please update this list whenever your medications are discontinued, doses are      changed, or new medications (including over-the-counter products) are added. *  Please carry medication information at all times in case of emergency situations. These are general instructions for a healthy lifestyle:    No smoking/ No tobacco products/ Avoid exposure to second hand smoke    Surgeon General's Warning:  Quitting smoking now greatly reduces serious risk to your health.     Obesity, smoking, and sedentary lifestyle greatly increases your risk for illness    A healthy diet, regular physical exercise & weight monitoring are important for maintaining a healthy lifestyle    You may be retaining fluid if you have a history of heart failure or if you experience any of the following symptoms:  Weight gain of 3 pounds or more overnight or 5 pounds in a week, increased swelling in our hands or feet or shortness of breath while lying flat in bed. Please call your doctor as soon as you notice any of these symptoms; do not wait until your next office visit. Recognize signs and symptoms of STROKE:    F-face looks uneven    A-arms unable to move or move unevenly    S-speech slurred or non-existent    T-time-call 911 as soon as signs and symptoms begin-DO NOT go       Back to bed or wait to see if you get better-TIME IS BRAIN. The discharge information has been reviewed with the patient. The patient verbalized understanding. TO PREVENT AN INFECTION      1. [address] YOUR HANDS     To prevent infection, good handwashing is the most important thing you or your caregiver can do.  Wash your hands with soap and water or use the hand  we gave you before you touch any wounds. 2. SHOWER     Use the antibacterial soap we gave you when you take a shower.  Shower with this soap until your wounds are healed.  To reach all areas of your body, you may need someone to help you.  Dont forget to clean your belly button with every shower. 3.  USE CLEAN SHEETS     Use freshly cleaned sheets on your bed after surgery.  To keep the surgery site clean, do not allow pets to sleep with you while your wound is still healing. 4. STOP SMOKING     Stop smoking, or at least cut back on smoking     Smoking slows your healing. 5.  CONTROL YOUR BLOOD SUGAR     High blood sugars slow wound healing. If you are diabetic, control your blood sugar levels before and after your surgery.  Patient Education      Hydrocodone/Acetaminophen (Vicodin, Norco, Lortab) - (By mouth)   Why this medicine is used:   Treats pain. Contact a nurse or doctor right away if you have:  · Blistering, peeling, red skin rash  · Fast or slow heartbeat, shallow breathing, blue lips, fingernails, or skin  · Anxiety, restlessness, muscle spasms, twitching, seeing or hearing things that are not there  · Dark urine or pale stools, yellow skin or eyes  · Extreme weakness, sweating, seizures, cold or clammy skin  · Lightheadedness, dizziness, fainting, fever, sweating     Common side effects:  · Constipation, nausea, vomiting, loss of appetite, stomach pain  · Tiredness or sleepiness  © 2017 [facility] Information is for End User's use only and may not be sold, redistributed or otherwise used for commercial purposes.

## 2021-12-15 NOTE — PERIOP NOTES
13:05= has had 3 Moderna vaccinations, has not been exposed or had s/s virus; results negative. Warming blanket applied but not turned on.

## 2021-12-15 NOTE — ANESTHESIA PREPROCEDURE EVALUATION
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 Pacemaker has been removed. No further sequelae.      GI/Hepatic/Renal         Renal disease (Stage IV): CRI       Endo/Other    Diabetes: well controlled, type 2    Arthritis and cancer (prostate, s/p XRT)    Comments: Prostate CA Other Findings   Comments: Cataracts           Physical Exam    Airway  Mallampati: III  TM Distance: 4 - 6 cm  Neck ROM: normal range of motion   Mouth opening: Normal     Cardiovascular    Rhythm: regular  Rate: normal         Dental    Dentition: Poor dentition, Upper partial plate and Lower partial plate  Comments: Partials are out   Pulmonary  Breath sounds clear to auscultation               Abdominal  GI exam deferred       Other Findings            Anesthetic Plan    ASA: 3  Anesthesia type: MAC          Induction: Intravenous  Anesthetic plan and risks discussed with: Patient

## 2021-12-15 NOTE — PERIOP NOTES
TRANSFER - IN REPORT:    Verbal report received from Viktor Candelaria RN & JACKIE Tobar CRNA(name) on [employer]  being received from OR(unit) for routine post - op      Report consisted of patients Situation, Background, Assessment and   Recommendations(SBAR). Information from the following report(s) OR Summary was reviewed with the receiving nurse. Opportunity for questions and clarification was provided. Assessment completed upon patients arrival to unit and care assumed.

## 2021-12-15 NOTE — BRIEF OP NOTE
Brief Postoperative Note    Patient: Elise Casey  YOB: 1935  MRN: [id number]    Date of Procedure: 12/15/2021     Pre-Op Diagnosis: SCCA and melanoma    Post-Op Diagnosis: Same as preoperative diagnosis.       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

## 2021-12-15 NOTE — ANESTHESIA POSTPROCEDURE EVALUATION
Procedure(s):  EXCISION RIGHT NOSE  AND FOREHEAD SKIN CANCER WITH SKIN GRAFT. MAC    Anesthesia Post Evaluation      Multimodal analgesia: multimodal analgesia used between 6 hours prior to anesthesia start to PACU discharge  Patient location during evaluation: PACU  Level of consciousness: sleepy but conscious  Pain management: adequate  Airway patency: patent  Anesthetic complications: no  Cardiovascular status: acceptable  Respiratory status: acceptable  Hydration status: acceptable  Comments: +Post-Anesthesia Evaluation and Assessment    Patient: Kate Ramirez MRN: [id number]  SSN: [id number]   YOB: 1935  Age: 82 y.o. Sex: male      Cardiovascular Function/Vital Signs    /65 (BP 1 Location: Right upper arm, BP Patient Position: At rest)   Pulse 75   Temp 36.9 °C (98.5 °F)   Resp 16   Ht 5' 11\" (1.803 m)   Wt 85.5 kg (188 lb 7.9 oz)   SpO2 97%   BMI 26.29 kg/m²     Patient is status post Procedure(s):  EXCISION RIGHT NOSE  AND FOREHEAD SKIN CANCER WITH SKIN GRAFT. Nausea/Vomiting: Controlled. Postoperative hydration reviewed and adequate. Pain:  Pain Scale 1: Numeric (0 - 10) (12/15/21 1650)  Pain Intensity 1: 0 (12/15/21 1650)   Managed. Neurological Status:   Neuro (WDL): Exceptions to WDL (12/15/21 1650)   At baseline. Mental Status and Level of Consciousness: Arousable. Pulmonary Status:   O2 Device: None (Room air) (12/15/21 1650)   Adequate oxygenation and airway patent. Complications related to anesthesia: None    Post-anesthesia assessment completed. No concerns.     Signed By: Ron Salomon MD    12/15/2021  Post anesthesia nausea and vomiting:  controlled  Final Post Anesthesia Temperature Assessment:  Normothermia (36.0-37.5 degrees C)      INITIAL Post-op Vital signs:   Vitals Value Taken Time   /65 12/15/21 1650   Temp 36.9 °C (98.5 °F) 12/15/21 1650   Pulse 75 12/15/21 1654   Resp 18 12/15/21 1654   SpO2 94 % 12/15/21 1654   Vitals shown include unvalidated device data.

## 2021-12-15 NOTE — PERIOP NOTES
Dr. Anita Solano notified for PACU sign-out for transfer to Phase II and home, he stated I could go ahead and send him, will sing-out later.

## 2021-12-20 NOTE — OP NOTES
[facility]  OPERATIVE REPORT    Name:  Delfino Griffin  MR#:  [id number]  :  1935  ACCOUNT #:  [de-identified]  DATE OF SERVICE:  12/15/2021    PREOPERATIVE DIAGNOSES:  1.  Melanoma in situ of right nose. 2.  Basal cell carcinoma of forehead. POSTOPERATIVE DIAGNOSES:  1.  Melanoma in situ of right nose. 2.  Basal cell carcinoma of forehead. PROCEDURE PERFORMED:  1. Wide excision of right nose melanoma in situ measuring 2.8 x 2.8 cm.  2.  Full-thickness skin graft reconstruction of right nose measuring 2.8 x 2.8 cm.  3.  Excision of forehead basal cell carcinoma measuring 1.2 x 2.4 cm. 4.  Complex closure of forehead wound measuring 2.6 cm. SURGEON:  Erika Valdez MD    ASSISTANT:  Kyleigh Singh    ANESTHESIA:  Local with IV sedation. COMPLICATIONS:  None. SPECIMENS REMOVED:  1. Right nose melanoma. 2.  Forehead basal cell carcinoma. IMPLANTS:  none. ESTIMATED BLOOD LOSS:  Less than 10 mL. INDICATIONS:  This 30-year-old white male presented with biopsy-proven melanoma in situ of the right lateral nose/cheek junction for which wide excision and reconstruction were indicated. He also had a biopsy-proven basal cell carcinoma of the forehead midline that required excision and closure. Risks, benefits, and alternatives were discussed preoperatively, and he agreed to proceed. PROCEDURE:  After informed consent was obtained under IV sedation, the operative sites were infiltrated with 1% lidocaine with epinephrine and then prepped and draped. Under loupe magnification, attention was first turned to the right lateral nose cheek junction. The visible melanoma was excised down to and including a layer of subcutaneous tissue leaving approximately 8 mm margins around the lesion peripherally. It was marked with a single stitch and sent for permanent pathology. Attention was turned to the reconstruction. This was a large defect that cannot be closed primarily. A full-thickness skin graft was harvested transverse ellipse from the right anterolateral neck. The donor site was closed primarily with absorbable suture, and Dermabond was applied. The graft was defatted, fenestrated, cut to the appropriate size, and secured to the wound bed using 5-0 plain gut. Multiple quilting sutures were utilized. Attention was then turned to the forehead, where the visible lesion was excised as a transverse ellipse within relaxed skin tension lines leaving 2-3 mm margins peripherally. It was sent for pathology. The defect could not be closed primarily without tension, so significant undermining was performed that measured approximately 2.5 cm around the entire periphery of the wound. The wound was irrigated, and complex closure was then performed in layers with 4-0 Vicryl and 5-0 Prolene. Antibiotic ointment and dry dressings were applied. He tolerated the procedure well and was transferred to the recovery room in good condition.         Shawn Rodriguez MD      [id number]  D:  12/19/2021 18:08  T:  12/20/2021 1:07  JOB #:  [id number]

## 2022-03-18 PROBLEM — H21.81 FLOPPY IRIS SYNDROME: Status: ACTIVE | Noted: 2021-06-10

## 2022-03-19 PROBLEM — H25.812 COMBINED FORMS OF AGE-RELATED CATARACT OF LEFT EYE: Status: ACTIVE | Noted: 2021-06-22

## 2022-03-20 PROBLEM — H25.811 COMBINED FORMS OF AGE-RELATED CATARACT OF RIGHT EYE: Status: ACTIVE | Noted: 2021-06-10

## 2022-03-20 PROBLEM — H11.002 PTERYGIUM EYE, LEFT: Status: ACTIVE | Noted: 2021-06-23

## 2022-08-24 ENCOUNTER — TRANSCRIBE ORDER (OUTPATIENT)
Dept: SCHEDULING | Age: 87
End: 2022-08-24

## 2022-08-24 DIAGNOSIS — C61 PROSTATE CANCER (HCC): ICD-10-CM

## 2022-08-24 DIAGNOSIS — M85.80 OSTEOPENIA: ICD-10-CM

## 2022-08-24 DIAGNOSIS — Z79.818 ANDROGEN DEPRIVATION THERAPY: Primary | ICD-10-CM

## 2022-10-07 ENCOUNTER — HOSPITAL ENCOUNTER (OUTPATIENT)
Age: 87
Setting detail: OUTPATIENT SURGERY
Discharge: HOME | End: 2022-10-07
Attending: INTERNAL MEDICINE | Admitting: INTERNAL MEDICINE
Payer: MEDICARE

## 2022-10-07 ENCOUNTER — ANESTHESIA EVENT (OUTPATIENT)
Dept: ENDOSCOPY | Age: 87
End: 2022-10-07
Payer: MEDICARE

## 2022-10-07 ENCOUNTER — ANESTHESIA (OUTPATIENT)
Dept: ENDOSCOPY | Age: 87
End: 2022-10-07
Payer: MEDICARE

## 2022-10-07 VITALS
HEART RATE: 69 BPM | RESPIRATION RATE: 15 BRPM | TEMPERATURE: 97.1 F | OXYGEN SATURATION: 97 % | DIASTOLIC BLOOD PRESSURE: 76 MMHG | SYSTOLIC BLOOD PRESSURE: 143 MMHG

## 2022-10-07 PROCEDURE — 77030036656: Performed by: INTERNAL MEDICINE

## 2022-10-07 PROCEDURE — 2709999900 HC NON-CHARGEABLE SUPPLY: Performed by: INTERNAL MEDICINE

## 2022-10-07 PROCEDURE — 74011250636 HC RX REV CODE- 250/636: Performed by: NURSE ANESTHETIST, CERTIFIED REGISTERED

## 2022-10-07 PROCEDURE — 76060000031 HC ANESTHESIA FIRST 0.5 HR: Performed by: INTERNAL MEDICINE

## 2022-10-07 PROCEDURE — 76040000019: Performed by: INTERNAL MEDICINE

## 2022-10-07 PROCEDURE — 77030035621 HC PRB COAG APC 360DEG ERBE -C: Performed by: INTERNAL MEDICINE

## 2022-10-07 PROCEDURE — 74011000250 HC RX REV CODE- 250: Performed by: NURSE ANESTHETIST, CERTIFIED REGISTERED

## 2022-10-07 RX ORDER — NALOXONE HYDROCHLORIDE 0.4 MG/ML
0.4 INJECTION, SOLUTION INTRAMUSCULAR; INTRAVENOUS; SUBCUTANEOUS
Status: DISCONTINUED | OUTPATIENT
Start: 2022-10-07 | End: 2022-10-07 | Stop reason: HOSPADM

## 2022-10-07 RX ORDER — FENTANYL CITRATE 50 UG/ML
25-200 INJECTION, SOLUTION INTRAMUSCULAR; INTRAVENOUS
Status: DISCONTINUED | OUTPATIENT
Start: 2022-10-07 | End: 2022-10-07 | Stop reason: HOSPADM

## 2022-10-07 RX ORDER — ATROPINE SULFATE 0.1 MG/ML
0.5 INJECTION INTRAVENOUS
Status: DISCONTINUED | OUTPATIENT
Start: 2022-10-07 | End: 2022-10-07 | Stop reason: HOSPADM

## 2022-10-07 RX ORDER — SODIUM CHLORIDE 9 MG/ML
50 INJECTION, SOLUTION INTRAVENOUS CONTINUOUS
Status: DISCONTINUED | OUTPATIENT
Start: 2022-10-07 | End: 2022-10-07 | Stop reason: HOSPADM

## 2022-10-07 RX ORDER — SODIUM CHLORIDE 0.9 % (FLUSH) 0.9 %
5-40 SYRINGE (ML) INJECTION EVERY 8 HOURS
Status: DISCONTINUED | OUTPATIENT
Start: 2022-10-07 | End: 2022-10-07 | Stop reason: HOSPADM

## 2022-10-07 RX ORDER — SODIUM CHLORIDE 9 MG/ML
INJECTION, SOLUTION INTRAVENOUS
Status: DISCONTINUED | OUTPATIENT
Start: 2022-10-07 | End: 2022-10-07 | Stop reason: HOSPADM

## 2022-10-07 RX ORDER — DEXTROMETHORPHAN/PSEUDOEPHED 2.5-7.5/.8
1.2 DROPS ORAL
Status: DISCONTINUED | OUTPATIENT
Start: 2022-10-07 | End: 2022-10-07 | Stop reason: HOSPADM

## 2022-10-07 RX ORDER — EPINEPHRINE 0.1 MG/ML
1 INJECTION INTRACARDIAC; INTRAVENOUS
Status: DISCONTINUED | OUTPATIENT
Start: 2022-10-07 | End: 2022-10-07 | Stop reason: HOSPADM

## 2022-10-07 RX ORDER — LIDOCAINE HYDROCHLORIDE 20 MG/ML
INJECTION, SOLUTION EPIDURAL; INFILTRATION; INTRACAUDAL; PERINEURAL AS NEEDED
Status: DISCONTINUED | OUTPATIENT
Start: 2022-10-07 | End: 2022-10-07 | Stop reason: HOSPADM

## 2022-10-07 RX ORDER — MIDAZOLAM HYDROCHLORIDE 1 MG/ML
.25-5 INJECTION, SOLUTION INTRAMUSCULAR; INTRAVENOUS
Status: DISCONTINUED | OUTPATIENT
Start: 2022-10-07 | End: 2022-10-07 | Stop reason: HOSPADM

## 2022-10-07 RX ORDER — PROPOFOL 10 MG/ML
INJECTION, EMULSION INTRAVENOUS AS NEEDED
Status: DISCONTINUED | OUTPATIENT
Start: 2022-10-07 | End: 2022-10-07 | Stop reason: HOSPADM

## 2022-10-07 RX ORDER — FLUMAZENIL 0.1 MG/ML
0.2 INJECTION INTRAVENOUS
Status: DISCONTINUED | OUTPATIENT
Start: 2022-10-07 | End: 2022-10-07 | Stop reason: HOSPADM

## 2022-10-07 RX ORDER — SODIUM CHLORIDE 0.9 % (FLUSH) 0.9 %
5-40 SYRINGE (ML) INJECTION AS NEEDED
Status: DISCONTINUED | OUTPATIENT
Start: 2022-10-07 | End: 2022-10-07 | Stop reason: HOSPADM

## 2022-10-07 RX ADMIN — PROPOFOL 25 MG: 10 INJECTION, EMULSION INTRAVENOUS at 12:43

## 2022-10-07 RX ADMIN — PROPOFOL 20 MG: 10 INJECTION, EMULSION INTRAVENOUS at 12:47

## 2022-10-07 RX ADMIN — SODIUM CHLORIDE: 900 INJECTION, SOLUTION INTRAVENOUS at 12:34

## 2022-10-07 RX ADMIN — LIDOCAINE HYDROCHLORIDE 50 MG: 20 INJECTION, SOLUTION EPIDURAL; INFILTRATION; INTRACAUDAL; PERINEURAL at 12:39

## 2022-10-07 RX ADMIN — PROPOFOL 50 MG: 10 INJECTION, EMULSION INTRAVENOUS at 12:39

## 2022-10-07 RX ADMIN — PROPOFOL 15 MG: 10 INJECTION, EMULSION INTRAVENOUS at 12:50

## 2022-10-07 RX ADMIN — PROPOFOL 15 MG: 10 INJECTION, EMULSION INTRAVENOUS at 12:48

## 2022-10-07 RX ADMIN — PROPOFOL 15 MG: 10 INJECTION, EMULSION INTRAVENOUS at 12:45

## 2022-10-07 RX ADMIN — PROPOFOL 25 MG: 10 INJECTION, EMULSION INTRAVENOUS at 12:41

## 2022-10-07 NOTE — DISCHARGE INSTRUCTIONS
[facility]    COLON DISCHARGE INSTRUCTIONS    Laurie Rosario  [id number]  1935    Discomfort:  Redness at IV site- apply warm compress to area; if redness or soreness persist- contact your physician  There may be a slight amount of blood passed from the rectum  Gaseous discomfort- walking, belching will help relieve any discomfort  You may not operate a vehicle for 12 hours  You may not engage in an occupation involving machinery or appliances for rest of today  You may not drink alcoholic beverages for at least 12 hours  Avoid making any critical decisions for at least 24 hour  DIET:  You may resume your regular diet - however -  remember your colon is empty and a heavy meal will produce gas. Avoid these foods:  vegetables, fried / greasy foods, carbonated drinks     ACTIVITY:  You may  resume your normal daily activities it is recommended that you spend the remainder of the day resting -  avoid any strenuous activity. CALL M.D. ANY SIGN OF:   Increasing pain, nausea, vomiting  Abdominal distension (swelling)  New increased bleeding (oral or rectal)  Fever (chills)  Pain in chest area  Bloody discharge from nose or mouth  Shortness of breath      Follow-up Instructions:   Call Dr. 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 Ablation (cautery) was performed today. It may be necessary to repeat this procedure in the next 2-4 weeks. Please call my office if the bleeding recurs. Telephone # [phone]      Signed By: Carolynn Sam.  Rickey Vargas MD     10/7/2022  12:57 PM       DISCHARGE SUMMARY from Nurse    The following personal items collected during your admission are returned to you:   Dental Appliance: Dental Appliances: None  Vision: Visual Aid: None  Hearing Aid:    Jewelry:    Clothing:    Other Valuables:    Valuables sent to safe:      Learning About Coronavirus (COVID-19)  Coronavirus (COVID-19): Overview  What is coronavirus (IJKYL-29)? The coronavirus disease (COVID-19) is caused by a virus. It is an illness that was first found in [place], [place], in December 2019. It has since spread worldwide. The virus can cause fever, cough, and trouble breathing. In severe cases, it can cause pneumonia and make it hard to breathe without help. It can cause death. Coronaviruses are a large group of viruses. They cause the common cold. They also cause more serious illnesses like Middle East respiratory syndrome (MERS) and severe acute respiratory syndrome (SARS). COVID-19 is caused by a novel coronavirus. That means it's a new type that has not been seen in people before. This virus spreads person-to-person through droplets from coughing and sneezing. It can also spread when you are close to someone who is infected. And it can spread when you touch something that has the virus on it, such as a doorknob or a tabletop. What can you do to protect yourself from coronavirus (COVID-19)? The best way to protect yourself from getting sick is to: Avoid areas where there is an outbreak. Avoid contact with people who may be infected. Wash your hands often with soap or alcohol-based hand sanitizers. Avoid crowds and try to stay at least 6 feet away from other people. Wash your hands often, especially after you cough or sneeze. Use soap and water, and scrub for at least 20 seconds. If soap and water aren't available, use an alcohol-based hand . Avoid touching your mouth, nose, and eyes. What can you do to avoid spreading the virus to others? To help avoid spreading the virus to others:  Cover your mouth with a tissue when you cough or sneeze. Then throw the tissue in the trash. Use a disinfectant to clean things that you touch often. Stay home if you are sick or have been exposed to the virus. Don't go to school, work, or public areas. And don't use public transportation. If you are sick:  Leave your home only if you need to get medical care. But call the doctor's office first so they know you're coming. And wear a face mask, if you have one. If you have a face mask, wear it whenever you're around other people. It can help stop the spread of the virus when you cough or sneeze. Clean and disinfect your home every day. Use household  and disinfectant wipes or sprays. Take special care to clean things that you grab with your hands. These include doorknobs, remote controls, phones, and handles on your refrigerator and microwave. And don't forget countertops, tabletops, bathrooms, and computer keyboards. When to call for help  Call 911 anytime you think you may need emergency care. For example, call if:  You have severe trouble breathing. (You can't talk at all.)  You have constant chest pain or pressure. You are severely dizzy or lightheaded. You are confused or can't think clearly. Your face and lips have a blue color. You pass out (lose consciousness) or are very hard to wake up. Call your doctor now if you develop symptoms such as:  Shortness of breath. Fever. Cough. If you need to get care, call ahead to the doctor's office for instructions before you go. Make sure you wear a face mask, if you have one, to prevent exposing other people to the virus. Where can you get the latest information? The following health organizations are tracking and studying this virus. Their websites contain the most up-to-date information. Lamont Gerard also learn what to do if you think you may have been exposed to the virus. U.S. Centers for Disease Control and Prevention (CDC): The CDC provides updated news about the disease and travel advice. The website also tells you how to prevent the spread of infection. www.cdc.gov  World Health Organization [facility]): WHO offers information about the virus outbreaks.  WHO also has travel advice. www.who.int  Current as of: April 1, 2020               Content Version: 12.4  © 4058-5716 [employer], Incorporated. Care instructions adapted under license by your healthcare professional. If you have questions about a medical condition or this instruction, always ask your healthcare professional. [address] any warranty or liability for your use of this information.

## 2022-10-07 NOTE — H&P
[facility] [address]      History and Physical       NAME:  Bev Falcon   :   1935   MRN:   [id number]             History of Present Illness:  Patient is a 82 y. o. who is seen for rectal bleeding. PMH:  Past Medical History:   Diagnosis Date    Arrhythmia     had pacemaker in past/removed (see Dr. Arthur Wagner at [address] as needed)    Arthritis     right hand    Arthritis     Left knee    Cancer ([address]     skin cancer,left ear    Chronic kidney disease     Stage 4 Dr. Todd Mayorga    Diabetes [facility])     DM2-oral meds    Hypertension     Prostate cancer [facility])     28 radiation treatments completed, Dr. Joyce Senior, ONC-Dr. Magen Kimball    Psychiatric disorder     depression       PSH:  Past Surgical History:   Procedure Laterality Date    COLONOSCOPY N/A 3/18/2021    COLONOSCOPY/RECTAL EUS WITH FIDUCIAL MARKERS   :- [facility] Caryle Almond., [facility]    HX CATARACT REMOVAL Bilateral 2021    complex with iris hooks-Dr. Dereje Stanley ORTHOPAEDIC      right shoulder cyst removed,part of clavicle    HX PACEMAKER      35 years ago changedx1 then removed    HX TONSIL AND ADENOIDECTOMY         Allergies:  No Known Allergies    Home Medications:  Prior to Admission Medications   Prescriptions Last Dose Informant Patient Reported? Taking? PAROXETINE HCL PO   Yes No   Sig: Take 10 mg by mouth daily. aspirin delayed-release (ASPIRIN LOW DOSE) 81 mg tablet   Yes No   Sig: Take 81 mg by mouth nightly. calcium phosphate trib/vit D3 (CITRACAL + D3, CALCIUM PHOS, PO)   Yes No   Sig: Take  by mouth daily. cholecalciferol (Vitamin D3) (1000 Units /25 mcg) tablet   Yes No   Sig: Take 1,000 Units by mouth nightly. diclofenac (VOLTAREN) 1 % gel   Yes No   Sig: Apply 2 g to affected area as needed for Pain.   leuprolide acetate (LUPRON DEPOT IM)   Yes No   Sig: by IntraMUSCular route every 6 months.  Every 6 months, next due 2022   Patient not taking: Reported on 12/15/2021   simvastatin (ZOCOR) 20 mg tablet   Yes No   Sig: Take 20 mg by mouth nightly. tamsulosin (FLOMAX) 0.4 mg capsule   Yes No   Sig: Take 0.4 mg by mouth nightly. Facility-Administered Medications: None       Hospital Medications:  No current facility-administered medications for this encounter. Social History:  Social History     Tobacco Use    Smoking status: Former     Packs/day: 0.25     Years: 40.00     Pack years: 10.00     Types: Cigarettes     Quit date:      Years since quittin.    Smokeless tobacco: Never    Tobacco comments:     cigar a day   Substance Use Topics    Alcohol use: Not Currently       Family History:  Family History   Problem Relation Age of Onset    Cancer Mother         breast    Cancer Father         lung             Review of Systems:      Constitutional: negative fever, negative chills, negative weight loss  Eyes:   negative visual changes  ENT:   negative sore throat, tongue or lip swelling  Respiratory:  negative cough, negative dyspnea  Cards:  negative for chest pain, palpitations, lower extremity edema  GI:   See HPI  :  negative for frequency, dysuria  Integument:  negative for rash and pruritus  Heme:  negative for easy bruising and gum/nose bleeding  Musculoskel: negative for myalgias,  back pain and muscle weakness  Neuro: negative for headaches, dizziness, vertigo  Psych:  negative for feelings of anxiety, depression       Objective:   No data found. No intake/output data recorded. No intake/output data recorded. EXAM:     NEURO-a&o   HEENT-wnl   LUNGS-clear    COR-regular rate and rhythym     ABD-soft , no tenderness, no rebound, good bs     EXT-no edema     Data Review     No results for input(s): WBC, HGB, HCT, PLT, HGBEXT, HCTEXT, PLTEXT in the last 72 hours. No results for input(s): NA, K, CL, CO2, BUN, CREA, GLU, PHOS, CA in the last 72 hours.   No results for input(s): AP, TBIL, TP, ALB, GLOB, GGT, AML, LPSE in the last 72 hours. No lab exists for component: SGOT, GPT, AMYP, HLPSE  No results for input(s): INR, PTP, APTT, INREXT in the last 72 hours. 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 The patient was made aware that amber Covid-19 after a surgical procedure may worsen their prognosis for recovering from the virus and lend to a higher morbidity and or mortality risk. The patient was given the options of postponing their procedure. All of the risks, benefits, and alternatives were discussed. The patient does wish to proceed with the procedure. Endoscopic procedure with MAC     Signed By: Dayanna Lynn.  Buddy Barrios MD     10/7/2022  12:10 PM

## 2022-10-07 NOTE — PROGRESS NOTES

## 2022-10-07 NOTE — ANESTHESIA PREPROCEDURE EVALUATION
Relevant Problems   No relevant active problems       Anesthetic History   No history of anesthetic complications            Review of Systems / Medical History  Patient summary reviewed, nursing notes reviewed and pertinent labs reviewed    Pulmonary              Pertinent negatives: No COPD and asthma     Neuro/Psych         Psychiatric history  Pertinent negatives: No seizures and CVA  Comments: Depression Cardiovascular    Hypertension        Dysrhythmias     Pertinent negatives: No past MI  Exercise tolerance: >4 METS  Comments: Patient had pacemaker because of cardiac arrest in 35s. Pacemaker has been removed. No further sequelae.      GI/Hepatic/Renal         Renal disease (Stage IV): CRI       Endo/Other    Diabetes: well controlled, type 2    Arthritis and cancer (prostate, s/p XRT)    Comments: Prostate CA Other Findings   Comments: Cataracts           Physical Exam    Airway  Mallampati: III  TM Distance: 4 - 6 cm  Neck ROM: normal range of motion   Mouth opening: Normal     Cardiovascular    Rhythm: regular  Rate: normal         Dental    Dentition: Poor dentition, Upper partial plate and Lower partial plate  Comments: Partials are out   Pulmonary  Breath sounds clear to auscultation               Abdominal  GI exam deferred       Other Findings            Anesthetic Plan    ASA: 3  Anesthesia type: MAC          Induction: Intravenous  Anesthetic plan and risks discussed with: Patient

## 2022-10-07 NOTE — ANESTHESIA POSTPROCEDURE EVALUATION
Post-Anesthesia Evaluation and Assessment    Patient: Leonardo Schmitt MRN: [id number]  SSN: [id number]    YOB: 1935  Age: 82 y.o. Sex: male      I have evaluated the patient and they are stable and ready for discharge from the PACU. Cardiovascular Function/Vital Signs  Visit Vitals  BP (!) 143/76   Pulse 69   Temp 36.2 °C (97.1 °F)   Resp 15   SpO2 97%       Patient is status post MAC anesthesia for Procedure(s):  SIGMOIDOSCOPY FLEXIBLE  ENDOSCOPIC ARGON PLASMA COAGULATION. Nausea/Vomiting: None    Postoperative hydration reviewed and adequate. Pain:  Pain Scale 1: Numeric (0 - 10) (10/07/22 1314)  Pain Intensity 1: 0 (10/07/22 1314)   Managed    Neurological Status: At baseline    Mental Status, Level of Consciousness: Alert and  oriented to person, place, and time    Pulmonary Status:   O2 Device: None (Room air) (10/07/22 1314)   Adequate oxygenation and airway patent    Complications related to anesthesia: None    Post-anesthesia assessment completed.  No concerns    Signed By: Ashley Wynn MD     October 7, 2022

## 2022-10-07 NOTE — PROCEDURES
[address]  [address] [facility], [address] [place] [address]      Colonoscopy Operative Report    Catrina Dunham  [id number]  1935      Procedure Type:   Flexible sigmoidoscopy with control of bleeding    Indications:  Rectal bleeding        Pre-operative Diagnosis: see indication above    Post-operative Diagnosis:  See findings below    :  Erica Kumar MD    Staff: Endoscopy Anderson Soto: Mikaela Mcmullen  Endoscopy RN-1: Josefina Washington RN  Endoscopy RN-2: Cj Stoner RN     Referring Provider: Dona Davies MD, WILFREDO Hall MD      Sedation:  MAC anesthesia Propofol      Procedure Details:  After informed consent was obtained with all risks and benefits of procedure explained and preoperative exam completed, the patient was taken to the endoscopy suite and placed in the left lateral decubitus position. Upon sequential sedation as per above, a digital rectal exam was performed demonstrating internal hemorrhoids. The Olympus gastroscope  was inserted in the rectum and carefully advanced to the sigmoid colon. The quality of preparation was good. The colonoscope was slowly withdrawn with careful evaluation between folds. Retroflexion in the rectum was completed . Findings: There was active oozing from radiation proctitis. Hemostasis was achieved with APC. Specimen Removed:  none    Complications: None. EBL:  None. Impression:    as above    Recommendations:  Monitor CBC  Monitor for recurrent bleeding  Consider repeat flexible sigmoidoscopy in next 2-4 weeks based on clinical course    Signed By: Erica Kumar MD     10/7/2022  12:59 PM

## 2022-11-02 ENCOUNTER — HOSPITAL ENCOUNTER (OUTPATIENT)
Dept: RADIATION THERAPY | Age: 87
Discharge: HOME | End: 2022-11-02

## 2022-11-03 ENCOUNTER — ANESTHESIA (OUTPATIENT)
Dept: ENDOSCOPY | Age: 87
End: 2022-11-03
Payer: MEDICARE

## 2022-11-03 ENCOUNTER — HOSPITAL ENCOUNTER (OUTPATIENT)
Age: 87
Setting detail: OUTPATIENT SURGERY
Discharge: HOME | End: 2022-11-03
Attending: INTERNAL MEDICINE | Admitting: INTERNAL MEDICINE
Payer: MEDICARE

## 2022-11-03 ENCOUNTER — ANESTHESIA EVENT (OUTPATIENT)
Dept: ENDOSCOPY | Age: 87
End: 2022-11-03
Payer: MEDICARE

## 2022-11-03 VITALS
RESPIRATION RATE: 18 BRPM | DIASTOLIC BLOOD PRESSURE: 73 MMHG | HEIGHT: 71 IN | HEART RATE: 72 BPM | OXYGEN SATURATION: 95 % | SYSTOLIC BLOOD PRESSURE: 137 MMHG | WEIGHT: 183 LBS | TEMPERATURE: 98.4 F | BODY MASS INDEX: 25.62 KG/M2

## 2022-11-03 PROCEDURE — 74011000250 HC RX REV CODE- 250: Performed by: NURSE ANESTHETIST, CERTIFIED REGISTERED

## 2022-11-03 PROCEDURE — 77030035621 HC PRB COAG APC 360DEG ERBE -C: Performed by: INTERNAL MEDICINE

## 2022-11-03 PROCEDURE — 76040000019: Performed by: INTERNAL MEDICINE

## 2022-11-03 PROCEDURE — 74011250636 HC RX REV CODE- 250/636: Performed by: NURSE ANESTHETIST, CERTIFIED REGISTERED

## 2022-11-03 PROCEDURE — 2709999900 HC NON-CHARGEABLE SUPPLY: Performed by: INTERNAL MEDICINE

## 2022-11-03 PROCEDURE — 76060000031 HC ANESTHESIA FIRST 0.5 HR: Performed by: INTERNAL MEDICINE

## 2022-11-03 PROCEDURE — 77030036656: Performed by: INTERNAL MEDICINE

## 2022-11-03 RX ORDER — SODIUM CHLORIDE 0.9 % (FLUSH) 0.9 %
5-40 SYRINGE (ML) INJECTION AS NEEDED
Status: DISCONTINUED | OUTPATIENT
Start: 2022-11-03 | End: 2022-11-03 | Stop reason: HOSPADM

## 2022-11-03 RX ORDER — PROPOFOL 10 MG/ML
INJECTION, EMULSION INTRAVENOUS AS NEEDED
Status: DISCONTINUED | OUTPATIENT
Start: 2022-11-03 | End: 2022-11-03 | Stop reason: HOSPADM

## 2022-11-03 RX ORDER — NALOXONE HYDROCHLORIDE 0.4 MG/ML
0.4 INJECTION, SOLUTION INTRAMUSCULAR; INTRAVENOUS; SUBCUTANEOUS
Status: DISCONTINUED | OUTPATIENT
Start: 2022-11-03 | End: 2022-11-03 | Stop reason: HOSPADM

## 2022-11-03 RX ORDER — MIDAZOLAM HYDROCHLORIDE 1 MG/ML
.25-5 INJECTION, SOLUTION INTRAMUSCULAR; INTRAVENOUS
Status: DISCONTINUED | OUTPATIENT
Start: 2022-11-03 | End: 2022-11-03 | Stop reason: HOSPADM

## 2022-11-03 RX ORDER — ATROPINE SULFATE 0.1 MG/ML
0.5 INJECTION INTRAVENOUS
Status: DISCONTINUED | OUTPATIENT
Start: 2022-11-03 | End: 2022-11-03 | Stop reason: HOSPADM

## 2022-11-03 RX ORDER — FENTANYL CITRATE 50 UG/ML
25-200 INJECTION, SOLUTION INTRAMUSCULAR; INTRAVENOUS
Status: DISCONTINUED | OUTPATIENT
Start: 2022-11-03 | End: 2022-11-03 | Stop reason: HOSPADM

## 2022-11-03 RX ORDER — LIDOCAINE HYDROCHLORIDE 20 MG/ML
INJECTION, SOLUTION EPIDURAL; INFILTRATION; INTRACAUDAL; PERINEURAL AS NEEDED
Status: DISCONTINUED | OUTPATIENT
Start: 2022-11-03 | End: 2022-11-03 | Stop reason: HOSPADM

## 2022-11-03 RX ORDER — DEXTROMETHORPHAN/PSEUDOEPHED 2.5-7.5/.8
1.2 DROPS ORAL
Status: DISCONTINUED | OUTPATIENT
Start: 2022-11-03 | End: 2022-11-03 | Stop reason: HOSPADM

## 2022-11-03 RX ORDER — SODIUM CHLORIDE 9 MG/ML
50 INJECTION, SOLUTION INTRAVENOUS CONTINUOUS
Status: DISCONTINUED | OUTPATIENT
Start: 2022-11-03 | End: 2022-11-03 | Stop reason: HOSPADM

## 2022-11-03 RX ORDER — FLUMAZENIL 0.1 MG/ML
0.2 INJECTION INTRAVENOUS
Status: DISCONTINUED | OUTPATIENT
Start: 2022-11-03 | End: 2022-11-03 | Stop reason: HOSPADM

## 2022-11-03 RX ORDER — SODIUM CHLORIDE 0.9 % (FLUSH) 0.9 %
5-40 SYRINGE (ML) INJECTION EVERY 8 HOURS
Status: DISCONTINUED | OUTPATIENT
Start: 2022-11-03 | End: 2022-11-03 | Stop reason: HOSPADM

## 2022-11-03 RX ORDER — EPINEPHRINE 0.1 MG/ML
1 INJECTION INTRACARDIAC; INTRAVENOUS
Status: DISCONTINUED | OUTPATIENT
Start: 2022-11-03 | End: 2022-11-03 | Stop reason: HOSPADM

## 2022-11-03 RX ORDER — SODIUM CHLORIDE 9 MG/ML
INJECTION, SOLUTION INTRAVENOUS
Status: DISCONTINUED | OUTPATIENT
Start: 2022-11-03 | End: 2022-11-03 | Stop reason: HOSPADM

## 2022-11-03 RX ADMIN — PROPOFOL 20 MG: 10 INJECTION, EMULSION INTRAVENOUS at 10:21

## 2022-11-03 RX ADMIN — SODIUM CHLORIDE: 900 INJECTION, SOLUTION INTRAVENOUS at 10:14

## 2022-11-03 RX ADMIN — PROPOFOL 20 MG: 10 INJECTION, EMULSION INTRAVENOUS at 10:19

## 2022-11-03 RX ADMIN — PROPOFOL 20 MG: 10 INJECTION, EMULSION INTRAVENOUS at 10:17

## 2022-11-03 RX ADMIN — PROPOFOL 80 MG: 10 INJECTION, EMULSION INTRAVENOUS at 10:14

## 2022-11-03 RX ADMIN — LIDOCAINE HYDROCHLORIDE 60 MG: 20 INJECTION, SOLUTION EPIDURAL; INFILTRATION; INTRACAUDAL; PERINEURAL at 10:14

## 2022-11-03 RX ADMIN — PROPOFOL 20 MG: 10 INJECTION, EMULSION INTRAVENOUS at 10:24

## 2022-11-03 NOTE — H&P
[facility]  174 [facility], 9 [facility]      History and Physical       NAME:  Bev Falcon   :   1935   MRN:   [id number]             History of Present Illness:  Patient is a 82 y.o. who is seen for radiation proctitis. PMH:  Past Medical History:   Diagnosis Date    Arrhythmia     had pacemaker in past/removed (see Dr. Arthur Wagner at [address] as needed)    Arthritis     right hand    Arthritis     Left knee    Cancer ([address]     skin cancer,left ear    Chronic kidney disease     Stage 4 Dr. Todd Mayorga    Diabetes [facility])     DM2-oral meds    Hypertension     Prostate cancer [facility])     28 radiation treatments completed, Dr. Joyce Senior, ONC-Dr. Magen Kimball    Psychiatric disorder     depression       PSH:  Past Surgical History:   Procedure Laterality Date    COLONOSCOPY N/A 3/18/2021    COLONOSCOPY/RECTAL EUS WITH FIDUCIAL MARKERS   :- performed by Caryle Almond., MD at  [address] 10/7/2022    SIGMOIDOSCOPY FLEXIBLE performed by Caryle Almond., MD at [facility] ENDOSCOPY    HX CATARACT REMOVAL Bilateral 2021    complex with iris hooks-Dr. Dereje Stanley ORTHOPAEDIC      right shoulder cyst removed,part of clavicle    HX PACEMAKER      35 years ago changedx1 then removed    HX TONSIL AND ADENOIDECTOMY         Allergies:  No Known Allergies    Home Medications:  Prior to Admission Medications   Prescriptions Last Dose Informant Patient Reported? Taking? PAROXETINE HCL PO   Yes No   Sig: Take 10 mg by mouth daily. Patient not taking: Reported on 10/7/2022   aspirin delayed-release 81 mg tablet Not Taking  Yes No   Sig: Take 81 mg by mouth nightly. Patient not taking: No sig reported   calcium phosphate trib/vit D3 (CITRACAL + D3, CALCIUM PHOS, PO) 2022  Yes Yes   Sig: Take  by mouth daily. cholecalciferol (VITAMIN D3) (1000 Units /25 mcg) tablet 2022  Yes Yes   Sig: Take 1,000 Units by mouth nightly.    diclofenac (VOLTAREN) 1 % gel 10/27/2022  Yes Yes   Sig: Apply 2 g to affected area as needed for Pain.   leuprolide acetate (LUPRON DEPOT IM)   Yes No   Sig: by IntraMUSCular route every 6 months. Every 6 months, next due 2022   Patient not taking: Reported on 12/15/2021   simvastatin (ZOCOR) 20 mg tablet 2022  Yes Yes   Sig: Take 20 mg by mouth nightly. tamsulosin (FLOMAX) 0.4 mg capsule 2022  Yes Yes   Sig: Take 0.4 mg by mouth nightly.       Facility-Administered Medications: None       Hospital Medications:  Current Facility-Administered Medications   Medication Dose Route Frequency    0.9% sodium chloride infusion  50 mL/hr IntraVENous CONTINUOUS    sodium chloride (NS) flush 5-40 mL  5-40 mL IntraVENous Q8H    sodium chloride (NS) flush 5-40 mL  5-40 mL IntraVENous PRN    midazolam (VERSED) injection 0.25-5 mg  0.25-5 mg IntraVENous Multiple    fentaNYL citrate (PF) injection  mcg   mcg IntraVENous Multiple    naloxone (NARCAN) injection 0.4 mg  0.4 mg IntraVENous Multiple    flumazeniL (ROMAZICON) 0.1 mg/mL injection 0.2 mg  0.2 mg IntraVENous Multiple    simethicone (MYLICON) 70ER/9.9FP oral drops 80 mg  1.2 mL Oral Multiple    atropine injection 0.5 mg  0.5 mg IntraVENous ONCE PRN    EPINEPHrine (ADRENALIN) 0.1 mg/mL syringe 1 mg  1 mg Endoscopically ONCE PRN       Social History:  Social History     Tobacco Use    Smoking status: Former     Packs/day: 0.25     Years: 40.00     Pack years: 10.00     Types: Cigarettes     Quit date:      Years since quittin.8    Smokeless tobacco: Never    Tobacco comments:     cigar a day   Substance Use Topics    Alcohol use: Not Currently       Family History:  Family History   Problem Relation Age of Onset    Cancer Mother         breast    Cancer Father         lung             Review of Systems:      Constitutional: negative fever, negative chills, negative weight loss  Eyes:   negative visual changes  ENT:   negative sore throat, tongue or lip swelling  Respiratory:  negative cough, negative dyspnea  Cards:  negative for chest pain, palpitations, lower extremity edema  GI:   See HPI  :  negative for frequency, dysuria  Integument:  negative for rash and pruritus  Heme:  negative for easy bruising and gum/nose bleeding  Musculoskel: negative for myalgias,  back pain and muscle weakness  Neuro: negative for headaches, dizziness, vertigo  Psych:  negative for feelings of anxiety, depression       Objective:   Patient Vitals for the past 8 hrs:   BP Pulse Resp Height Weight   11/03/22 1001 (!) 147/69 -- -- -- --   11/03/22 0947 -- (P) 76 (P) 12 -- --   11/03/22 0937 -- -- -- 5' 11\" (1.803 m) 83 kg (183 lb)     No intake/output data recorded. No intake/output data recorded. EXAM:     NEURO-a&o   HEENT-wnl   LUNGS-clear    COR-regular rate and rhythym     ABD-soft , no tenderness, no rebound, good bs     EXT-no edema     Data Review     No results for input(s): WBC, HGB, HCT, PLT, HGBEXT, HCTEXT, PLTEXT in the last 72 hours. No results for input(s): NA, K, CL, CO2, BUN, CREA, GLU, PHOS, CA in the last 72 hours. No results for input(s): AP, TBIL, TP, ALB, GLOB, GGT, AML, LPSE in the last 72 hours. No lab exists for component: SGOT, GPT, AMYP, HLPSE  No results for input(s): INR, PTP, APTT, INREXT in the last 72 hours. 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   The patient was made aware that amber Covid-19 after a surgical procedure may worsen their prognosis for recovering from the virus and lend to a higher morbidity and or mortality risk. The patient was given the options of postponing their procedure. All of the risks, benefits, and alternatives were discussed. The patient does wish to proceed with the procedure. Endoscopic procedure with MAC     Signed By: Shan Hill.  Krysta Dennis MD     11/3/2022  10:08 AM

## 2022-11-03 NOTE — DISCHARGE INSTRUCTIONS
[address] [facility]    COLON DISCHARGE INSTRUCTIONS    Claribel Cruz  [id number]  1935    Discomfort:  Redness at IV site- apply warm compress to area; if redness or soreness persist- contact your physician  There may be a slight amount of blood passed from the rectum  Gaseous discomfort- walking, belching will help relieve any discomfort  You may not operate a vehicle for 12 hours  You may not engage in an occupation involving machinery or appliances for rest of today  You may not drink alcoholic beverages for at least 12 hours  Avoid making any critical decisions for at least 24 hour  DIET:  You may resume your regular diet - however -  remember your colon is empty and a heavy meal will produce gas. Avoid these foods:  vegetables, fried / greasy foods, carbonated drinks     ACTIVITY:  You may  resume your normal daily activities it is recommended that you spend the remainder of the day resting -  avoid any strenuous activity. CALL M.D. ANY SIGN OF:   Increasing pain, nausea, vomiting  Abdominal distension (swelling)  New increased bleeding (oral or rectal)  Fever (chills)  Pain in chest area  Bloody discharge from nose or mouth  Shortness of breath      Follow-up Instructions:   Call Dr. Maude Manrique for any questions or problems at 1 1405          ENDOSCOPY FINDINGS:   Your flexible sigmoidoscopy showed radiation proctitis. This was ablated with argon plasma coagulation. My office will arrange for you to have a blood count checked and we will also plan for another procedure in case bleeding recurs. Telephone # [phone]      Signed By: Carrillo Mark MD     11/3/2022  10:38 AM       DISCHARGE SUMMARY from Nurse    The following personal items collected during your admission are returned to you:   Dental Appliance: Dental Appliances: None  Vision: Visual Aid: None  Hearing Aid:    Jewelry:    Clothing:    Other Valuables:    Valuables sent to safe:      Learning About Coronavirus (COVID-19)  Coronavirus (COVID-19): Overview  What is coronavirus (WOPNV-97)? The coronavirus disease (COVID-19) is caused by a virus. It is an illness that was first found in [place], [place], in December 2019. It has since spread worldwide. The virus can cause fever, cough, and trouble breathing. In severe cases, it can cause pneumonia and make it hard to breathe without help. It can cause death. Coronaviruses are a large group of viruses. They cause the common cold. They also cause more serious illnesses like Middle East respiratory syndrome (MERS) and severe acute respiratory syndrome (SARS). COVID-19 is caused by a novel coronavirus. That means it's a new type that has not been seen in people before. This virus spreads person-to-person through droplets from coughing and sneezing. It can also spread when you are close to someone who is infected. And it can spread when you touch something that has the virus on it, such as a doorknob or a tabletop. What can you do to protect yourself from coronavirus (COVID-19)? The best way to protect yourself from getting sick is to: Avoid areas where there is an outbreak. Avoid contact with people who may be infected. Wash your hands often with soap or alcohol-based hand sanitizers. Avoid crowds and try to stay at least 6 feet away from other people. Wash your hands often, especially after you cough or sneeze. Use soap and water, and scrub for at least 20 seconds. If soap and water aren't available, use an alcohol-based hand . Avoid touching your mouth, nose, and eyes. What can you do to avoid spreading the virus to others? To help avoid spreading the virus to others:  Cover your mouth with a tissue when you cough or sneeze. Then throw the tissue in the trash. Use a disinfectant to clean things that you touch often. Stay home if you are sick or have been exposed to the virus.  Don't go to school, work, or public areas. And don't use public transportation. If you are sick:  Leave your home only if you need to get medical care. But call the doctor's office first so they know you're coming. And wear a face mask, if you have one. If you have a face mask, wear it whenever you're around other people. It can help stop the spread of the virus when you cough or sneeze. Clean and disinfect your home every day. Use household  and disinfectant wipes or sprays. Take special care to clean things that you grab with your hands. These include doorknobs, remote controls, phones, and handles on your refrigerator and microwave. And don't forget countertops, tabletops, bathrooms, and computer keyboards. When to call for help  Call 911 anytime you think you may need emergency care. For example, call if:  You have severe trouble breathing. (You can't talk at all.)  You have constant chest pain or pressure. You are severely dizzy or lightheaded. You are confused or can't think clearly. Your face and lips have a blue color. You pass out (lose consciousness) or are very hard to wake up. Call your doctor now if you develop symptoms such as:  Shortness of breath. Fever. Cough. If you need to get care, call ahead to the doctor's office for instructions before you go. Make sure you wear a face mask, if you have one, to prevent exposing other people to the virus. Where can you get the latest information? The following health organizations are tracking and studying this virus. Their websites contain the most up-to-date information. Celina Sims also learn what to do if you think you may have been exposed to the virus. U.S. Centers for Disease Control and Prevention (CDC): The CDC provides updated news about the disease and travel advice. The website also tells you how to prevent the spread of infection. www.cdc.gov  World Health Organization [facility]): WHO offers information about the virus outbreaks. WHO also has travel advice. www.who.int  Current as of: April 1, 2020               Content Version: 12.4  © 3024-0508 Health[employer], Incorporated. Care instructions adapted under license by your healthcare professional. If you have questions about a medical condition or this instruction, always ask your healthcare professional. [address] any warranty or liability for your use of this information.

## 2022-11-03 NOTE — PROCEDURES
[address]  174 [facility], [address] [place] [address]      Flexible Sigmoidoscopy Operative Report    Raj Zapien  [id number]  1935      Procedure Type:   Flexible Sigmoidoscopy    Indications:  radiation proctitis        Pre-operative Diagnosis: see indication above    Post-operative Diagnosis:  See findings below    :  Santiago Lugo. Bud Vizcarra MD    Staff: Endoscopy [place] Cap: Nelli Ray  Endoscopy RN-1: Celeste Castellanos RN     Referring Provider: WILFREDO Soliman MD, Brett Lazcaon MD      Sedation:  MAC anesthesia Propofol      Procedure Details:  After informed consent was obtained with all risks and benefits of procedure explained and preoperative exam completed, the patient was taken to the endoscopy suite and placed in the left lateral decubitus position. Upon sequential sedation as per above, a digital rectal exam was performed demonstrating internal hemorrhoids. The Olympus gastroscope was inserted in the rectum and carefully advanced to the sigmoid colon. The quality of preparation was adequate. The colonoscope was slowly withdrawn with careful evaluation between folds. Retroflexion in the rectum was completed . Findings:   Radiation proctitis was seen in the distal rectum. The distribution was similar to the prior exam. There was ulceration present, which was likely secondary to prior APC treatment. Additional APC treatment was performed and hemostasis was achieved. Specimen Removed:  none    Complications: None. EBL:  None. Impression:      Radiation proctitis    Recommendations:  CBC  We will tentatively plan for repeat flexible sigmoidoscopy in 3-4 weeks in the even bleeding recurs. We will consider RFA next. Signed By: Santiago Lugo.  Bud Vizcarra MD     11/3/2022  10:40 AM

## 2022-11-03 NOTE — PERIOP NOTES

## 2022-11-03 NOTE — ANESTHESIA POSTPROCEDURE EVALUATION
Post-Anesthesia Evaluation and Assessment    Patient: Arnav Metzger MRN: [id number]  SSN: [id number]    YOB: 1935  Age: 82 y.o. Sex: male      I have evaluated the patient and they are stable and ready for discharge from the PACU. Cardiovascular Function/Vital Signs  Visit Vitals  /73   Pulse 72   Temp 36.9 °C (98.4 °F)   Resp 18   Ht 5' 11\" (1.803 m)   Wt 83 kg (183 lb)   SpO2 95%   BMI 25.52 kg/m²       Patient is status post MAC anesthesia for Procedure(s):  SIGMOIDOSCOPY FLEXIBLE  ENDOSCOPIC ARGON PLASMA COAGULATION. Nausea/Vomiting: None    Postoperative hydration reviewed and adequate. Pain:  Pain Scale 1: Numeric (0 - 10) (11/03/22 1050)  Pain Intensity 1: 0 (11/03/22 1050)   Managed    Neurological Status: At baseline    Mental Status, Level of Consciousness: Alert and  oriented to person, place, and time    Pulmonary Status:   O2 Device: None (Room air) (11/03/22 1045)   Adequate oxygenation and airway patent    Complications related to anesthesia: None    Post-anesthesia assessment completed. No concerns    Signed By: Stefano Harley MD     November 3, 2022              Procedure(s):  SIGMOIDOSCOPY FLEXIBLE  ENDOSCOPIC ARGON PLASMA COAGULATION. MAC    <BSHSIANPOST>    INITIAL Post-op Vital signs:   Vitals Value Taken Time   /94 11/03/22 1050   Temp 36.9 °C (98.4 °F) 11/03/22 1031   Pulse 78 11/03/22 1050   Resp 22 11/03/22 1050   SpO2 97 % 11/03/22 1050   Vitals shown include unvalidated device data.

## 2023-01-01 ENCOUNTER — HOSPICE ADMISSION (OUTPATIENT)
Age: 88
End: 2023-01-01
Payer: MEDICARE

## 2023-01-01 PROCEDURE — 0655 HSPC INPATIENT RESPITE

## 2023-01-01 PROCEDURE — 0651 HSPC ROUTINE HOME CARE

## 2023-01-01 PROCEDURE — 2500000001 HSPC NON INJECTABLE MED

## 2023-01-01 PROCEDURE — 0656 HSPC GENERAL INPATIENT

## 2023-01-01 PROCEDURE — 3331090004 HSPC SERVICE INTENSITY ADD-ON

## 2023-02-10 ENCOUNTER — HOSPITAL ENCOUNTER (OUTPATIENT)
Dept: BONE DENSITY | Age: 88
Discharge: HOME | End: 2023-02-10
Attending: UROLOGY
Payer: MEDICARE

## 2023-02-10 DIAGNOSIS — C61 PROSTATE CANCER (HCC): ICD-10-CM

## 2023-02-10 DIAGNOSIS — M85.80 OSTEOPENIA: ICD-10-CM

## 2023-02-10 DIAGNOSIS — Z79.818 ANDROGEN DEPRIVATION THERAPY: ICD-10-CM

## 2023-02-10 PROCEDURE — 77080 DXA BONE DENSITY AXIAL: CPT

## 2023-05-11 ENCOUNTER — HOSPITAL ENCOUNTER (OUTPATIENT)
Facility: HOSPITAL | Age: 88
Discharge: HOME | End: 2023-05-11
Payer: MEDICARE

## 2023-05-11 DIAGNOSIS — Z01.818 PRE-OP EVALUATION: ICD-10-CM

## 2023-05-11 PROCEDURE — 71046 X-RAY EXAM CHEST 2 VIEWS: CPT

## 2023-05-15 ENCOUNTER — TRANSCRIBE ORDERS (OUTPATIENT)
Facility: HOSPITAL | Age: 88
End: 2023-05-15

## 2023-05-15 DIAGNOSIS — R91.1 PULMONARY NODULE 1 CM OR GREATER IN DIAMETER: Primary | ICD-10-CM

## 2023-05-16 ENCOUNTER — HOSPITAL ENCOUNTER (OUTPATIENT)
Facility: HOSPITAL | Age: 88
Discharge: HOME | End: 2023-05-19
Payer: MEDICARE

## 2023-05-16 DIAGNOSIS — R91.1 PULMONARY NODULE 1 CM OR GREATER IN DIAMETER: ICD-10-CM

## 2023-05-16 LAB — CREAT BLD-MCNC: 1.8 MG/DL (ref 0.6–1.3)

## 2023-05-16 PROCEDURE — 71260 CT THORAX DX C+: CPT

## 2023-05-16 PROCEDURE — 82565 ASSAY OF CREATININE: CPT

## 2023-05-16 PROCEDURE — 6360000004 HC RX CONTRAST MEDICATION: Performed by: RADIOLOGY

## 2023-05-16 RX ADMIN — IOPAMIDOL 100 ML: 755 INJECTION, SOLUTION INTRAVENOUS at 16:52

## 2023-06-19 ENCOUNTER — HOSPITAL ENCOUNTER (OUTPATIENT)
Facility: HOSPITAL | Age: 88
Discharge: HOME | End: 2023-06-22
Payer: MEDICARE

## 2023-06-19 DIAGNOSIS — R91.8 MASS OF RIGHT LUNG: ICD-10-CM

## 2023-06-19 PROCEDURE — A9552 F18 FDG: HCPCS | Performed by: INTERNAL MEDICINE

## 2023-06-19 PROCEDURE — 3430000000 HC RX DIAGNOSTIC RADIOPHARMACEUTICAL: Performed by: INTERNAL MEDICINE

## 2023-06-19 PROCEDURE — 78815 PET IMAGE W/CT SKULL-THIGH: CPT

## 2023-06-19 RX ORDER — FLUDEOXYGLUCOSE F-18 500 MCI/ML
11.19 INJECTION INTRAVENOUS
Status: COMPLETED | OUTPATIENT
Start: 2023-06-19 | End: 2023-06-19

## 2023-06-19 RX ADMIN — FLUDEOXYGLUCOSE F-18 11.19 MILLICURIE: 500 INJECTION INTRAVENOUS at 09:59

## 2023-09-01 ENCOUNTER — HOME CARE VISIT (OUTPATIENT)
Facility: HOME HEALTH | Age: 88
End: 2023-09-01
Payer: MEDICARE

## 2023-09-01 VITALS
RESPIRATION RATE: 16 BRPM | HEART RATE: 88 BPM | BODY MASS INDEX: 24.36 KG/M2 | SYSTOLIC BLOOD PRESSURE: 122 MMHG | HEIGHT: 71 IN | DIASTOLIC BLOOD PRESSURE: 64 MMHG | WEIGHT: 174 LBS

## 2023-09-01 PROCEDURE — G0299 HHS/HOSPICE OF RN EA 15 MIN: HCPCS

## 2023-09-01 ASSESSMENT — ENCOUNTER SYMPTOMS
DYSPNEA ACTIVITY LEVEL: AFTER AMBULATING 10 - 20 FT
PAIN LOCATION - PAIN QUALITY: ARTHRITIC
TROUBLE SWALLOWING: 1
COUGH: 1
CONSTIPATION: 1
COUGH CHARACTERISTICS: DRY

## 2023-09-01 NOTE — HOSPICE
Mya Rosi \"Robson\" Rose Mccallum  [birth year]  87                                                       Principle Hospice Diagnosis: lung cancer - new DX and no treatment elected   Diagnoses RELATED to the terminal prognosis: right hilar mass, weight loss, possible kidney metastasis, anxiety   Other Diagnoses: CKD 4, history prostate cancer, melanoma, diabetes type 2, osteoarthritis, past history of rectal bleeding     Date of Hospice Admission: 23  Hospice Attending Elected by Patient: Dr. Diana Reeder   Primary Care Physician: Iglesia Xavier     Admitting RN: AMG  Nurse CM: [id number] [id number] Worker: Alyson Nuñeza:   Leonidas Aguilar DNR:  Yes    signed at admission, take a copy to the home    Home:    Direct Observation: Admission with Duglas Montes", his daughter Patricia Izquierdo and granddaughter also present. Pt in chair with his sweet senior Sherwood Skiff is alert and oriented x 4, Circle but participated fully in the conversation and signed his own consents. Pt went in for preop work up for knee surgery and was found to have a lung mass on CXR. Decision was made after biopsy due to state of the disease to not have further interventions. He has had two recent falls, 20lbs weight loss and cough. He has a poor appetite and in recent days states it is hard to swallow some of his meds. Pt is in no acute distress, denies pain other than joint pain to knees he uses voltaren gel for. Cough is managed with Mucinex D. Decreased lung sounds, loose non-productive cough. Discussion of meds with pt, family and hospice MD. Will add decadron to help with cough. Family is reluctant to start something stronger for cough as patient lives alone and wants to remain ambulatory and able to care for self and his dog. He takes several short walks per day around the community and likes to play cards with other residents. He needs to rest often and feels easily exhausted. PPS 60. Vitals WNL, continent of bowel and bladder.  Will begin bowel regime

## 2023-09-03 ENCOUNTER — HOME CARE VISIT (OUTPATIENT)
Facility: HOME HEALTH | Age: 88
End: 2023-09-03
Payer: MEDICARE

## 2023-09-03 VITALS
DIASTOLIC BLOOD PRESSURE: 66 MMHG | RESPIRATION RATE: 16 BRPM | HEART RATE: 72 BPM | SYSTOLIC BLOOD PRESSURE: 110 MMHG | OXYGEN SATURATION: 95 %

## 2023-09-03 PROCEDURE — G0299 HHS/HOSPICE OF RN EA 15 MIN: HCPCS

## 2023-09-05 ENCOUNTER — HOME CARE VISIT (OUTPATIENT)
Age: 88
End: 2023-09-05
Payer: MEDICARE

## 2023-09-05 ENCOUNTER — HOME CARE VISIT (OUTPATIENT)
Facility: HOME HEALTH | Age: 88
End: 2023-09-05
Payer: MEDICARE

## 2023-09-05 VITALS
DIASTOLIC BLOOD PRESSURE: 80 MMHG | OXYGEN SATURATION: 94 % | RESPIRATION RATE: 16 BRPM | TEMPERATURE: 96.6 F | SYSTOLIC BLOOD PRESSURE: 140 MMHG | HEART RATE: 84 BPM

## 2023-09-05 PROCEDURE — G0299 HHS/HOSPICE OF RN EA 15 MIN: HCPCS

## 2023-09-05 NOTE — HOSPICE
Patient is verbal and looking well, states he has good appetite. He gets around well with aid of a walker, he able to go to the store and shop for himself. Patient wife passed when she was [age] years old with Alzheimer. He reflects on the decline of his wife in a positive way, \" says she went down fast and dying was the best thing for her. Patient is supported by his and daughter, he retired from the city, the department of transportation patient was a member of [employer], where served in number of roles Patient would like monthly visits.  provided empathic hearing, prayer.   Goals will be to provide spiritual support over the next two weeks

## 2023-09-06 ENCOUNTER — HOME CARE VISIT (OUTPATIENT)
Facility: HOME HEALTH | Age: 88
End: 2023-09-06
Payer: MEDICARE

## 2023-09-06 PROCEDURE — G0155 HHCP-SVS OF CSW,EA 15 MIN: HCPCS

## 2023-09-06 NOTE — HOSPICE
PAtient A&Ox4, denies pain, vital signs WNL. Constipation over the weekend has given way to a bowel movement. He reports occasional discomfort at night, for which he uses Tylenol. He currently is comfortable and has no needs. Medication supply is adequate. Hospice orientation discussed and once weekly visit montse agreed upon. Encouraged patient to call at any time with any needs.

## 2023-09-07 NOTE — HOSPICE
LMSW attempted to schedule a visit on the fifth day. Patient stated that he had to get a hair cut and go grocery shopping. Patient asked for a Wednesday visit.

## 2023-09-07 NOTE — HOSPICE
LMSW arrived at the patient's facility to complete an Initial Psychosocial Assessment visit for [facility]. The LMSW was greeted at the front door by the patient's son Du. The apartment was clean and clutter free. The patient is an 81 y/o  male with a [facility] Dx. Malignant neoplasm of lung, unspecified laterality, unspecified part of lung. The patient was received sitting on a chair in the living room waiting for this visit. Patient was alert and oriented x 4. Patient reported that his appetite is poor but hoping that it will get better. Patient stated that his sleep habits are fair, Patient's mood was appropriate and congruent with conversation. Du stated that he is on SSDI and is a  at [employer] 3 days of the week. Du appeared to be coping appropriately and deferred all questions to his older sister Candice Hester who is a nurse that works two jobs. The patient was in the [employer] for six years from 61-67. The patient was a  with the [employer] for ten years and then the [address] [place] [address] for 28 years. Patient was  to Devon Ricci for 53 years who had passes in 2014. They had two children: Candice Hester who lives in [place] and [address] [place] [address] Brandon who lives in [place]. The patient has a brother Claire Kerr who lives in [place] that he communicates with from time to time. Patient belongs to the [facility] and currently does not receive support because they do not know the patient is in hospice care. Candice Hester will be in contact with the [employer]. LMSW provided community resources, emotional support, and supportive counseling to assist with hospice care.

## 2023-09-13 ENCOUNTER — HOME HEALTH/ HOSPICE FACE TO FACE (OUTPATIENT)
Facility: HOSPITAL | Age: 88
End: 2023-09-13

## 2023-09-13 ENCOUNTER — HOME CARE VISIT (OUTPATIENT)
Facility: HOME HEALTH | Age: 88
End: 2023-09-13
Payer: MEDICARE

## 2023-09-13 PROCEDURE — G0299 HHS/HOSPICE OF RN EA 15 MIN: HCPCS

## 2023-09-14 NOTE — HOSPICE
Jojo Ga to meet patient with his CM Jennifer Lovell. Daughter who is an MSN is present. 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 Also has CKD 4 and T2DM not on meds. Patient is not sleeping well at night and want to sleep better. He wants to try lorazepam again because it did help one night and we discussed using restoril if that does not work. Melatonin already tried at 12.5 mg and did not work. He also is getting very sob with activity. Daughter is concerned about this. Also, where will he go for care when he is bedbound. She has offered for patient to come to her home and he has been resistant. Daughter is concerned about him struggling with pain and shortness of breath at EOL. His cough has improved with the steroid but he has a lot of nasal congestion and still some cough. No wheezing. Daughter is also concerned about his continued weight loss--he does eat well. He has lost a lot of weight since his cancer diagnosis. Patient is still playing cards with several friends in his facility several times a week. He walks his dog Peanut several times a day but must stop to catch his breath more often and for longer periods of time. He has been resistant to his wheelchair and having someone wheel him to meals. Patient denies pain. He has BM's regularly and they are soft. No falls. O.  Gen:  thin elderly male seated in LR in NAD. Eyes:  anicteric, conjunctiva pale  HENT:  oral mucosa moist without lesions. Nasal mucosa erythematous and edematous with copious thick green purulent discharge in each nare with green purulent PND. No TTP over sinuses. Neck:  No JVD, No LAD  Heart:  RRR S1S2 no GRM's, no edema  Lungs:  mildly increased wob, CTA A&P. Ambulated patient several hundred feet with sob evident and sat to 90%.     Abd:  no distention, BS normoactive, No TTP/HSM/masses/R/G/R  Skin:  W&D  Neuro:  A&O X 3  MS:  no

## 2023-09-17 ASSESSMENT — ENCOUNTER SYMPTOMS
DYSPNEA ACTIVITY LEVEL: AFTER AMBULATING 10 - 20 FT
COUGH: 1
COUGH CHARACTERISTICS: NON-PRODUCTIVE

## 2023-09-20 ENCOUNTER — HOME CARE VISIT (OUTPATIENT)
Age: 88
End: 2023-09-20
Payer: MEDICARE

## 2023-09-20 VITALS
SYSTOLIC BLOOD PRESSURE: 131 MMHG | OXYGEN SATURATION: 97 % | TEMPERATURE: 97.2 F | RESPIRATION RATE: 20 BRPM | DIASTOLIC BLOOD PRESSURE: 66 MMHG | HEART RATE: 66 BPM

## 2023-09-20 PROCEDURE — G0299 HHS/HOSPICE OF RN EA 15 MIN: HCPCS

## 2023-09-20 ASSESSMENT — ENCOUNTER SYMPTOMS
STOOL DESCRIPTION: FORMED
DYSPNEA ACTIVITY LEVEL: AFTER AMBULATING LESS THAN 10 FT
COUGH CHARACTERISTICS: NON-PRODUCTIVE
HEMOPTYSIS: 0
PAIN LOCATION - PAIN QUALITY: SORE
COUGH: 1

## 2023-09-20 NOTE — HOSPICE
RNCM and RN greeted by patient's daughter, Sandra Langley, who reported the patient's history of  a fall last night at 115am. Patient's daughter states that this is his third fall in 4 weeks and that it's time to start the conversation about having more help either in his home or have him moved to another place with increased care. Patient's daughter said she would call Brayan Simms to discuss options to present to her dad. Patient is open to new housing options but resistant and in denial of his decline in capacity to live alone. Family is anxious to have more assistance especially at night. Patient's vital signs are WNL. Pt complains of soreness in back, right buttock (no bruising or marks on assessment), right lateral edge of foot, and left hand. X-rays ordered by Niki Mandujano NP upon description of the foot injury. [facility] to contact the family to set up a time to visit. Pt reports that pain is controlled with acetaminophen and is able to bear weight on the affected foot. DME ordered by Pauly Garcias RN - bedside commode and M-6 O2 tanks. RNCM educated patient and family on safety concerns about mobility around the home and fall risk precautions. Patient encouraged to use rolater around the apartment and wheelchair when he goes to the dining room. Patient needs continued education and encouragment.

## 2023-09-23 ENCOUNTER — HOME CARE VISIT (OUTPATIENT)
Age: 88
End: 2023-09-23
Payer: MEDICARE

## 2023-09-25 PROBLEM — R06.02 SHORTNESS OF BREATH: Status: ACTIVE | Noted: 2023-01-01

## 2023-09-25 PROBLEM — R45.1 RESTLESSNESS: Status: ACTIVE | Noted: 2023-01-01

## 2023-09-25 PROBLEM — Z51.5 HOSPICE CARE: Status: ACTIVE | Noted: 2023-01-01

## 2023-09-25 PROBLEM — G89.3 CANCER ASSOCIATED PAIN: Status: ACTIVE | Noted: 2023-01-01

## 2023-09-25 PROBLEM — C34.90 MALIGNANT NEOPLASM OF LUNG (HCC): Status: ACTIVE | Noted: 2023-01-01

## 2023-09-27 ENCOUNTER — HOME CARE VISIT (OUTPATIENT)
Age: 88
End: 2023-09-27
Payer: MEDICARE

## 2023-10-12 ENCOUNTER — HOME CARE VISIT (OUTPATIENT)
Age: 88
End: 2023-10-12
Payer: MEDICARE

## (undated) DEVICE — ENT-MRMCASU: Brand: MEDLINE INDUSTRIES, INC.

## (undated) DEVICE — Device

## (undated) DEVICE — ENDOSCOPIC ULTRASOUND ASPIRATION NEEDLE: Brand: EXPECT SLIMLINE SL

## (undated) DEVICE — SOLUTION IRRIG 1000ML 0.9% SOD CHL USP POUR PLAS BTL

## (undated) DEVICE — SNARE ENDOSCP M L240CM W27MM SHTH DIA2.4MM CHN 2.8MM OVL

## (undated) DEVICE — FLEXIBLE IRIS RETRACTORS, 6 UNITS AT 5 HOOKS: Brand: ALCON GRIESHABER

## (undated) DEVICE — SOLUTION IV 250ML 0.9% SOD CHL CLR INJ FLX BG CONT PRT CLSR

## (undated) DEVICE — Device: Brand: JELCO

## (undated) DEVICE — SURGICAL PROCEDURE PACK CATRCT CUST

## (undated) DEVICE — SUTURE VCRL SZ 4-0 L27IN ABSRB UD L17MM RB-1 1/2 CIR J214H

## (undated) DEVICE — REM POLYHESIVE ADULT PATIENT RETURN ELECTRODE: Brand: VALLEYLAB

## (undated) DEVICE — CLIP HEMO ENDOSCP 235CM BX/20 -- RESOLUTION 360

## (undated) DEVICE — 3M™ TEGADERM™ TRANSPARENT FILM DRESSING FRAME STYLE, 1624W, 2-3/8 IN X 2-3/4 IN (6 CM X 7 CM), 100/CT 4CT/CASE: Brand: 3M™ TEGADERM™

## (undated) DEVICE — SYR 3ML LL TIP 1/10ML GRAD --

## (undated) DEVICE — APC PROBE 2200 C, SINGLE USE OD 2.3MM/6.9FR; L 2.2M/7.2FT: Brand: ERBE

## (undated) DEVICE — DRESSING,GAUZE,XEROFORM,CURAD,1"X8",ST: Brand: CURAD

## (undated) DEVICE — SOLUTION IRRIG 500ML STRL H2O NONPYROGENIC

## (undated) DEVICE — SYR 5ML 1/5 GRAD LL NSAF LF --

## (undated) DEVICE — SYR 10ML LUER LOK 1/5ML GRAD --

## (undated) DEVICE — TRAP SURG QUAD PARABOLA SLOT DSGN SFTY SCRN TRAPEASE

## (undated) DEVICE — MARKER FIDUCIAL 0.018 INX5 MM STR PLAT LUMICOIL

## (undated) DEVICE — TUBING, SUCTION, 1/4" X 10', STRAIGHT: Brand: MEDLINE

## (undated) DEVICE — STERILE POLYISOPRENE POWDER-FREE SURGICAL GLOVES: Brand: PROTEXIS

## (undated) DEVICE — SYRINGE INSULIN 1ML LUERSLIP TIP W/O SFTY U100 BLISTER PK

## (undated) DEVICE — THE MONARCH® "D" CARTRIDGE IS A SINGLE-USE POLYPROPYLENE CARTRIDGE FOR POSTERIOR CHAMBER IOL DELIVERY: Brand: MONARCH® III

## (undated) DEVICE — NDL FLTR TIP 5 MIC 18GX1.5IN --

## (undated) DEVICE — KENDALL DL ECG CABLE AND LEAD WIRE SYSTEM, 3-LEAD, SINGLE PATIENT USE: Brand: KENDALL

## (undated) DEVICE — TUBING HYDR IRR --

## (undated) DEVICE — CONNECTOR ELECSURG ARCONNECT AR PRB SGL USE DISP

## (undated) DEVICE — BIPOLAR FORCEPS CORD: Brand: VALLEYLAB

## (undated) DEVICE — SUTURE PLN GUT SZ 5-0 L18IN ABSRB YELLOWISH TAN L13MM PC-1 1915G

## (undated) DEVICE — GLOVE ORANGE PI 7   MSG9070

## (undated) DEVICE — MICRODISSECTION NEEDLE STRAIGHT SLEEVE: Brand: COLORADO

## (undated) DEVICE — HIGH FLOW RATE EXTENSION SET, LUER LOCK ADAPTERS

## (undated) DEVICE — FORCEPS BX L240CM JAW DIA2.8MM L CAP W/ NDL MIC MESH TOOTH

## (undated) DEVICE — FRAZIER SUCTION INSTRUMENT 7 FR W/CONTROL VENT & OBTURATOR: Brand: FRAZIER

## (undated) DEVICE — SUTURE MCRYL SZ 4-0 L27IN ABSRB UD L19MM PS-2 1/2 CIR PRIM Y426H

## (undated) DEVICE — SOLUTION SCRB 2OZ 10% POVIDONE IOD ANTIMIC BTL

## (undated) DEVICE — GARMENT,MEDLINE,DVT,INT,CALF,MED, GEN2: Brand: MEDLINE

## (undated) DEVICE — BLADE ASSEMB CLP HAIR FINE --

## (undated) DEVICE — DERMABOND SKIN ADH 0.7ML -- DERMABOND ADVANCED 12/BX

## (undated) DEVICE — SYRINGE,EAR/ULCER, 2 OZ, STERILE: Brand: MEDLINE